# Patient Record
Sex: FEMALE | Race: WHITE | NOT HISPANIC OR LATINO | Employment: FULL TIME | URBAN - NONMETROPOLITAN AREA
[De-identification: names, ages, dates, MRNs, and addresses within clinical notes are randomized per-mention and may not be internally consistent; named-entity substitution may affect disease eponyms.]

---

## 2017-02-10 DIAGNOSIS — M25.512 CHRONIC PAIN OF BOTH SHOULDERS: ICD-10-CM

## 2017-02-10 DIAGNOSIS — M25.511 CHRONIC PAIN OF BOTH SHOULDERS: ICD-10-CM

## 2017-02-10 DIAGNOSIS — F41.9 ANXIETY: ICD-10-CM

## 2017-02-10 DIAGNOSIS — G89.29 CHRONIC PAIN OF BOTH SHOULDERS: ICD-10-CM

## 2017-02-10 RX ORDER — FLUOXETINE 10 MG/1
CAPSULE ORAL
Qty: 30 CAPSULE | Refills: 0 | Status: CANCELLED | OUTPATIENT
Start: 2017-02-10

## 2017-02-10 RX ORDER — TRAMADOL HYDROCHLORIDE 50 MG/1
TABLET ORAL
Qty: 60 TABLET | Refills: 0 | Status: CANCELLED | OUTPATIENT
Start: 2017-02-10

## 2017-02-10 RX ORDER — ALPRAZOLAM 0.5 MG/1
TABLET ORAL
Qty: 60 TABLET | Refills: 0 | OUTPATIENT
Start: 2017-02-10

## 2017-02-14 DIAGNOSIS — M25.512 CHRONIC PAIN OF BOTH SHOULDERS: ICD-10-CM

## 2017-02-14 DIAGNOSIS — F41.9 ANXIETY: ICD-10-CM

## 2017-02-14 DIAGNOSIS — M25.511 CHRONIC PAIN OF BOTH SHOULDERS: ICD-10-CM

## 2017-02-14 DIAGNOSIS — G89.29 CHRONIC PAIN OF BOTH SHOULDERS: ICD-10-CM

## 2017-02-14 RX ORDER — FLUOXETINE 10 MG/1
10 CAPSULE ORAL DAILY
Qty: 30 CAPSULE | Refills: 2 | Status: CANCELLED | OUTPATIENT
Start: 2017-02-14

## 2017-02-14 RX ORDER — TRAMADOL HYDROCHLORIDE 50 MG/1
50 TABLET ORAL EVERY 6 HOURS PRN
Qty: 60 TABLET | Refills: 2 | Status: CANCELLED | OUTPATIENT
Start: 2017-02-14

## 2017-02-14 NOTE — TELEPHONE ENCOUNTER
Control  If you are not covering for Ina please let me know and I will direct to the correct person. Thank you.

## 2017-02-27 DIAGNOSIS — G89.29 CHRONIC PAIN OF BOTH SHOULDERS: ICD-10-CM

## 2017-02-27 DIAGNOSIS — M25.511 CHRONIC PAIN OF BOTH SHOULDERS: ICD-10-CM

## 2017-02-27 DIAGNOSIS — F41.9 ANXIETY: ICD-10-CM

## 2017-02-27 DIAGNOSIS — M25.512 CHRONIC PAIN OF BOTH SHOULDERS: ICD-10-CM

## 2017-02-27 RX ORDER — FLUOXETINE 10 MG/1
CAPSULE ORAL
Qty: 30 CAPSULE | Refills: 0 | Status: SHIPPED | OUTPATIENT
Start: 2017-02-27 | End: 2017-03-14

## 2017-02-27 RX ORDER — TRAMADOL HYDROCHLORIDE 50 MG/1
TABLET ORAL
Qty: 60 TABLET | Refills: 0 | Status: SHIPPED | OUTPATIENT
Start: 2017-02-27 | End: 2017-03-14 | Stop reason: SDUPTHER

## 2017-02-27 RX ORDER — ALPRAZOLAM 0.5 MG/1
TABLET ORAL
Qty: 60 TABLET | Refills: 0 | Status: SHIPPED | OUTPATIENT
Start: 2017-02-27 | End: 2017-03-14 | Stop reason: SDUPTHER

## 2017-03-14 ENCOUNTER — OFFICE VISIT (OUTPATIENT)
Dept: FAMILY MEDICINE CLINIC | Facility: CLINIC | Age: 42
End: 2017-03-14

## 2017-03-14 VITALS
HEART RATE: 76 BPM | DIASTOLIC BLOOD PRESSURE: 82 MMHG | BODY MASS INDEX: 27.99 KG/M2 | WEIGHT: 168 LBS | HEIGHT: 65 IN | SYSTOLIC BLOOD PRESSURE: 118 MMHG

## 2017-03-14 DIAGNOSIS — E66.9 OBESITY (BMI 30.0-34.9): ICD-10-CM

## 2017-03-14 DIAGNOSIS — G89.29 CHRONIC RIGHT SHOULDER PAIN: Primary | ICD-10-CM

## 2017-03-14 DIAGNOSIS — G89.29 CHRONIC PAIN OF BOTH SHOULDERS: ICD-10-CM

## 2017-03-14 DIAGNOSIS — M25.511 CHRONIC PAIN OF BOTH SHOULDERS: ICD-10-CM

## 2017-03-14 DIAGNOSIS — M25.511 CHRONIC RIGHT SHOULDER PAIN: Primary | ICD-10-CM

## 2017-03-14 DIAGNOSIS — F41.9 ANXIETY: ICD-10-CM

## 2017-03-14 DIAGNOSIS — M25.512 CHRONIC PAIN OF BOTH SHOULDERS: ICD-10-CM

## 2017-03-14 DIAGNOSIS — J30.9 ALLERGIC RHINITIS, UNSPECIFIED ALLERGIC RHINITIS TRIGGER, UNSPECIFIED RHINITIS SEASONALITY: ICD-10-CM

## 2017-03-14 PROCEDURE — 99214 OFFICE O/P EST MOD 30 MIN: CPT | Performed by: NURSE PRACTITIONER

## 2017-03-14 RX ORDER — TRAMADOL HYDROCHLORIDE 50 MG/1
50 TABLET ORAL EVERY 6 HOURS PRN
Qty: 120 TABLET | Refills: 2 | Status: SHIPPED | OUTPATIENT
Start: 2017-03-14 | End: 2017-05-26 | Stop reason: SDUPTHER

## 2017-03-14 RX ORDER — ALPRAZOLAM 0.5 MG/1
0.5 TABLET ORAL 2 TIMES DAILY PRN
Qty: 60 TABLET | Refills: 0 | Status: SHIPPED | OUTPATIENT
Start: 2017-03-14 | End: 2017-04-11 | Stop reason: SDUPTHER

## 2017-03-14 RX ORDER — PHENTERMINE HYDROCHLORIDE 37.5 MG/1
37.5 TABLET ORAL EVERY MORNING
Qty: 30 TABLET | Refills: 0 | Status: SHIPPED | OUTPATIENT
Start: 2017-03-14 | End: 2017-05-10 | Stop reason: SDUPTHER

## 2017-03-14 RX ORDER — LORATADINE 10 MG/1
10 TABLET ORAL DAILY
Qty: 30 TABLET | Refills: 5 | Status: SHIPPED | OUTPATIENT
Start: 2017-03-14 | End: 2017-09-27 | Stop reason: SDUPTHER

## 2017-03-14 NOTE — PROGRESS NOTES
Subjective   Jaimie Ignacia Akers is a 42 y.o. female.     Allergies   Associated symptoms include arthralgias and congestion. Pertinent negatives include no abdominal pain, chest pain, chills, coughing, fatigue, fever, headaches, myalgias, nausea, rash, sore throat, vomiting or weakness.   Weight Loss   Associated symptoms include arthralgias and congestion. Pertinent negatives include no abdominal pain, chest pain, chills, coughing, fatigue, fever, headaches, myalgias, nausea, rash, sore throat, vomiting or weakness.      She has had head congestion and clear nasal drainage for a few weeks. She cannot tolerate Zyrtec well due to drowsiness. She is using Nasonex.    She has chronic right shoulder pain that is worsening and affecting her daily.  Tramadol and flexeril does not help very much.   She has not seen an orthopedist recently.     Her anxiety is not well-controlled with Prozac and prn Xanax. She stopped the Prozac due to feeling it worsened the symptoms. Xanax helped.    She wants to resume phentermine.     The following portions of the patient's history were reviewed and updated as appropriate: allergies, current medications, past medical history, past social history, past surgical history and problem list.    Review of Systems   Constitutional: Positive for weight loss. Negative for activity change, chills, fatigue and fever.        Difficulty losing weight.    HENT: Positive for congestion and sinus pressure. Negative for rhinorrhea and sore throat.    Eyes: Negative for pain, discharge, itching and visual disturbance.   Respiratory: Negative for cough, shortness of breath and wheezing.    Cardiovascular: Negative for chest pain, palpitations and leg swelling.   Gastrointestinal: Negative for abdominal pain, blood in stool, constipation, diarrhea, nausea and vomiting.   Endocrine: Negative for polydipsia and polyuria.   Genitourinary: Negative for dysuria, flank pain, frequency, hematuria, urgency and  vaginal bleeding.   Musculoskeletal: Positive for arthralgias. Negative for back pain, gait problem and myalgias.        Right shoulder pain.    Skin: Negative for rash.   Neurological: Negative for dizziness, tremors, seizures, syncope, weakness and headaches.   Hematological: Negative for adenopathy. Does not bruise/bleed easily.   Psychiatric/Behavioral: Negative for confusion, dysphoric mood, sleep disturbance and suicidal ideas. The patient is nervous/anxious.        Objective   Physical Exam   Constitutional: She is oriented to person, place, and time. She appears well-developed and well-nourished. No distress.   HENT:   Mouth/Throat: No oropharyngeal exudate.   Bilateral maxillary sinus tenderness   Eyes: Conjunctivae are normal. Pupils are equal, round, and reactive to light. Right eye exhibits no discharge. Left eye exhibits no discharge. No scleral icterus.   Neck: Neck supple. No thyromegaly present.   Cardiovascular: Normal rate, regular rhythm and normal heart sounds.    No murmur heard.  Pulmonary/Chest: Effort normal and breath sounds normal. No respiratory distress. She has no wheezes. She has no rales.   Musculoskeletal: Normal range of motion. She exhibits tenderness. She exhibits no edema or deformity.   Right anterior shoulder joint tenderness. Difficulty with abduction due to discomfort.     Lymphadenopathy:     She has no cervical adenopathy.   Neurological: She is alert and oriented to person, place, and time. No cranial nerve deficit. Coordination normal.   No balance disturbance observed.    Skin: Skin is warm and dry. No rash noted. No pallor.   Psychiatric: She has a normal mood and affect. Her behavior is normal.   Nursing note and vitals reviewed.      Assessment/Plan   Problems Addressed this Visit        Respiratory    Allergic rhinitis    Relevant Medications    loratadine (CLARITIN) 10 MG tablet       Other    Anxiety    Relevant Medications    ALPRAZolam (XANAX) 0.5 MG tablet       Other Visit Diagnoses     Chronic right shoulder pain    -  Primary    Relevant Medications    ALPRAZolam (XANAX) 0.5 MG tablet    diclofenac (VOLTAREN) 1 % gel gel    Other Relevant Orders    MRI Shoulder Right Without Contrast    Chronic pain of both shoulders        Relevant Medications    traMADol (ULTRAM) 50 MG tablet    Obesity (BMI 30.0-34.9)        Relevant Medications    phentermine (ADIPEX-P) 37.5 MG tablet        MRI right shoulder and refer to Dr. Gonzales if indicated after results.   Begin Voltaren gel.   Resume phentermine, diet and exercise.  Reviewed potential medication side effects and benefits. Instructed to report side effects. Report if symptoms worsen or persist. Understanding expressed.

## 2017-03-29 ENCOUNTER — OFFICE VISIT (OUTPATIENT)
Dept: OBSTETRICS AND GYNECOLOGY | Facility: CLINIC | Age: 42
End: 2017-03-29

## 2017-03-29 VITALS
SYSTOLIC BLOOD PRESSURE: 120 MMHG | HEIGHT: 65 IN | WEIGHT: 165.8 LBS | BODY MASS INDEX: 27.63 KG/M2 | HEART RATE: 80 BPM | DIASTOLIC BLOOD PRESSURE: 78 MMHG

## 2017-03-29 DIAGNOSIS — Z12.31 SCREENING MAMMOGRAM, ENCOUNTER FOR: ICD-10-CM

## 2017-03-29 DIAGNOSIS — Z01.419 ENCOUNTER FOR GYNECOLOGICAL EXAMINATION WITH PAPANICOLAOU SMEAR OF CERVIX: Primary | ICD-10-CM

## 2017-03-29 DIAGNOSIS — N92.1 EXCESSIVE AND FREQUENT MENSTRUATION WITH IRREGULAR CYCLE: ICD-10-CM

## 2017-03-29 PROCEDURE — 99396 PREV VISIT EST AGE 40-64: CPT | Performed by: NURSE PRACTITIONER

## 2017-03-29 PROCEDURE — 88141 CYTOPATH C/V INTERPRET: CPT | Performed by: PATHOLOGY

## 2017-03-29 PROCEDURE — 88142 CYTOPATH C/V THIN LAYER: CPT | Performed by: PATHOLOGY

## 2017-03-29 RX ORDER — MEDROXYPROGESTERONE ACETATE 5 MG/1
5 TABLET ORAL DAILY
Qty: 30 TABLET | Refills: 0 | Status: SHIPPED | OUTPATIENT
Start: 2017-03-29 | End: 2017-04-29 | Stop reason: SDUPTHER

## 2017-03-29 NOTE — PROGRESS NOTES
"Subjective   Chief Complaint   Patient presents with   • Gynecologic Exam     well woman annual visit     Jaimie Akers is a 42 y.o. year old  presenting to be seen for her annual exam.  Today she complains of irregular bleeding x 3 months. She started bleeding in Dec 2016 and has not stopped since. Bleeding varies in flow from spotting to heavy. She has had this happen several years ago and was put on OCPs that helped. She stopped taking them after approximately 1 yr. In the , PCP notes show she had same concerns. Labs were WNL. Provera 5mg x 10 days stopped pt's bleeding until intercourse which triggered a heavy flow.     Patient's last menstrual period was 2016 (approximate).    OB History      Para Term  AB TAB SAB Ectopic Multiple Living    1 1                  Current birth control method: none.     She has not been sexually active in \"several months.\"  In the past 12 months there has been new sexual partners.  Condoms are not typically used.  She would not like to be screened for STD's at today's exam.     Past 6 month menstrual history:    Cycle Frequency: unpredictable  irregular   Menstrual cycle character: light to heavy   Cycle Duration: Typically 5-7 days, until now   Number of heavy days of flows: Varies   Dysmenorrhea: none and is not affecting her activities of daily living   PMS: none and is not affecting her activities of daily living   Intermenstrual bleeding present: yes   Post-coital bleeding present: yes     She exercises regularly: yes.  She wears her seat belt:yes.  She has concerns about domestic violence: no.  Last colonoscopy: Never  Last DEXA: Never  Last MM  Last pap: , unsure of dates  History of abnormal PAP: No    The following portions of the patient's history were reviewed and updated as appropriate:problem list, current medications, allergies, past family history, past medical history, past social history and past surgical " "history.    Smoking status: Never Smoker                                                              Smokeless status: Never Used                        History   Alcohol Use No      Review of Systems   Constitutional: Negative.  Negative for chills and fever.   Respiratory: Negative.    Cardiovascular: Negative.    Gastrointestinal: Negative.  Negative for constipation and diarrhea.   Endocrine: Negative.  Negative for cold intolerance and heat intolerance.   Genitourinary: Positive for menstrual problem (see HPI). Negative for dyspareunia, pelvic pain, vaginal discharge and vaginal pain.   Skin: Negative.    Neurological: Negative for dizziness, syncope, light-headedness and headaches.   Psychiatric/Behavioral: Negative for suicidal ideas. The patient is not nervous/anxious.         Anxiety and depression, well managed         Objective   /78 (BP Location: Right arm, Patient Position: Sitting, Cuff Size: Adult)  Pulse 80  Ht 65\" (165.1 cm)  Wt 165 lb 12.8 oz (75.2 kg)  LMP 12/31/2016 (Approximate)  Breastfeeding? No  BMI 27.59 kg/m2    General:  well developed; well nourished  no acute distress   Skin:  No suspicious lesions seen   Thyroid: normal to inspection and palpation   Breasts:  Examined in supine position  Symmetric without masses or skin dimpling  Nipples normal without inversion, lesions or discharge  There are no palpable axillary nodes   Abdomen: soft, non-tender; no masses  no umbilical or inginual hernias are present  no hepato-splenomegaly  Normal findings: bowel sounds normal   Cardiac: Heart sounds are normal.  Regular rate and rhythm without murmur, gallop or rub.   Resp: Normal expansion.  Clear to auscultation.  No rales, rhonchi, or wheezing.   Psych: alert,oriented, in NAD with a full range of affect, normal behavior and no psychotic features   Pelvis: Uterus:  Clinical staff was present for exam  External genitalia:  normal appearance of the external genitalia including " Bartholin's and [place]ch[place]'s glands.  :  urethral meatus normal; urethral hypermobility is absent.  Vaginal:  normal pink mucosa without prolapse or lesions and blood present -  dark red, moderate amount and with clotting  Cervix:  normal appearance.  Uterus:  mildly enlarged and tender to palpation  Adnexa:  normal bimanual exam of the adnexa.     Lab Review   TSH and estradiol, progesterone, testosterone, FSH, LH    Imaging  No data reviewed       Assessment   1. Encounter for GYN exam with pap smear of cervix  2. Encounter for screening mammogram  3. Excessive and frequent menstruation with irregular cycle     Plan   1. Pap results: I will send card in mail or call if abnormal. RTC annually for well woman exams  2. Mammogram scheduled 4/18/17 at 3:00pm   3. Obtain TVUS tomorrow at 2:00pm. Follow up Friday at 10:00AM to review results and discuss next steps. Begin Provera 5mg to decrease bleeding. Consider STI testing prn.     New Medications Ordered This Visit   Medications   • medroxyPROGESTERone (PROVERA) 5 MG tablet     Sig: Take 1 tablet by mouth Daily.     Dispense:  30 tablet     Refill:  0          This note was electronically signed.    Iram Echols, APRN  March 29, 2017

## 2017-04-03 ENCOUNTER — APPOINTMENT (OUTPATIENT)
Dept: MRI IMAGING | Facility: HOSPITAL | Age: 42
End: 2017-04-03

## 2017-04-05 ENCOUNTER — OFFICE VISIT (OUTPATIENT)
Dept: OBSTETRICS AND GYNECOLOGY | Facility: CLINIC | Age: 42
End: 2017-04-05

## 2017-04-05 VITALS
HEART RATE: 64 BPM | HEIGHT: 65 IN | DIASTOLIC BLOOD PRESSURE: 76 MMHG | SYSTOLIC BLOOD PRESSURE: 120 MMHG | BODY MASS INDEX: 27.49 KG/M2 | RESPIRATION RATE: 18 BRPM | WEIGHT: 165 LBS

## 2017-04-05 DIAGNOSIS — N92.1 EXCESSIVE AND FREQUENT MENSTRUATION WITH IRREGULAR CYCLE: Primary | ICD-10-CM

## 2017-04-05 LAB
LAB AP CASE REPORT: NORMAL
LAB AP GYN ADDITIONAL INFORMATION: NORMAL
LAB AP GYN OTHER FINDINGS: NORMAL
Lab: NORMAL
PATH INTERP SPEC-IMP: NORMAL
STAT OF ADQ CVX/VAG CYTO-IMP: NORMAL

## 2017-04-05 PROCEDURE — 99212 OFFICE O/P EST SF 10 MIN: CPT | Performed by: NURSE PRACTITIONER

## 2017-04-05 NOTE — PROGRESS NOTES
Subjective   Jaimie Ignacia Akers is a 42 y.o. female.     History of Present Illness   Pt presents for follow up on ultrasound and plan of care for excessive and prolonged menstruation. US normal. Bleeding like hormonally related. Bleeding still occurring but lighter since starting Provera 5mg daily. Options presented to pt: Continue provera, depo provera, IUD, surgical consult for possible endometrial ablation.     The following portions of the patient's history were reviewed and updated as appropriate: allergies, current medications, past family history, past medical history, past social history, past surgical history and problem list.    Review of Systems   Constitutional: Negative.    Genitourinary: Positive for menstrual problem.       Objective   Physical Exam   Constitutional: She is oriented to person, place, and time. She appears well-developed and well-nourished.   Neurological: She is alert and oriented to person, place, and time.   Psychiatric: She has a normal mood and affect. Her behavior is normal.   Vitals reviewed.      Assessment/Plan   Jaimie was seen today for follow-up.    Diagnoses and all orders for this visit:    Excessive and frequent menstruation with irregular cycle     Pt is not currently sexually active but is not using hormonal contraceptives when she is. After discussion, pt believes she will best benefit from IUD placement for bleeding and contraceptive use. I discussed at length side effects and potential for continued irregular bleeding for some time. Discussed procedure also. Mirena brochure given. Pt wants to move forward with pre-cert and insertion at earliest convenience. We will contact pt to schedule.

## 2017-04-11 DIAGNOSIS — G89.29 CHRONIC RIGHT SHOULDER PAIN: ICD-10-CM

## 2017-04-11 DIAGNOSIS — M25.511 CHRONIC RIGHT SHOULDER PAIN: ICD-10-CM

## 2017-04-11 DIAGNOSIS — F41.9 ANXIETY: ICD-10-CM

## 2017-04-12 ENCOUNTER — APPOINTMENT (OUTPATIENT)
Dept: MRI IMAGING | Facility: HOSPITAL | Age: 42
End: 2017-04-12

## 2017-04-13 RX ORDER — BUPROPION HYDROCHLORIDE 150 MG/1
150 TABLET ORAL DAILY
Qty: 30 TABLET | Refills: 2 | Status: SHIPPED | OUTPATIENT
Start: 2017-04-13 | End: 2017-12-18

## 2017-04-13 RX ORDER — ALPRAZOLAM 0.5 MG/1
TABLET ORAL
Qty: 60 TABLET | Refills: 0 | Status: SHIPPED | OUTPATIENT
Start: 2017-04-13 | End: 2017-05-10 | Stop reason: SDUPTHER

## 2017-04-18 ENCOUNTER — APPOINTMENT (OUTPATIENT)
Dept: MRI IMAGING | Facility: HOSPITAL | Age: 42
End: 2017-04-18

## 2017-04-24 ENCOUNTER — APPOINTMENT (OUTPATIENT)
Dept: MRI IMAGING | Facility: HOSPITAL | Age: 42
End: 2017-04-24

## 2017-04-29 DIAGNOSIS — N92.1 EXCESSIVE AND FREQUENT MENSTRUATION WITH IRREGULAR CYCLE: ICD-10-CM

## 2017-05-02 RX ORDER — MEDROXYPROGESTERONE ACETATE 5 MG/1
TABLET ORAL
Qty: 30 TABLET | Refills: 0 | Status: SHIPPED | OUTPATIENT
Start: 2017-05-02 | End: 2017-05-03 | Stop reason: SDUPTHER

## 2017-05-03 RX ORDER — MEDROXYPROGESTERONE ACETATE 5 MG/1
5 TABLET ORAL DAILY
Qty: 30 TABLET | Refills: 5 | Status: SHIPPED | OUTPATIENT
Start: 2017-05-03 | End: 2017-12-06 | Stop reason: SDUPTHER

## 2017-05-10 DIAGNOSIS — G89.29 CHRONIC RIGHT SHOULDER PAIN: ICD-10-CM

## 2017-05-10 DIAGNOSIS — F41.9 ANXIETY: ICD-10-CM

## 2017-05-10 DIAGNOSIS — M25.511 CHRONIC RIGHT SHOULDER PAIN: ICD-10-CM

## 2017-05-10 DIAGNOSIS — E66.9 OBESITY (BMI 30.0-34.9): ICD-10-CM

## 2017-05-11 RX ORDER — PHENTERMINE HYDROCHLORIDE 37.5 MG/1
TABLET ORAL
Qty: 30 TABLET | Refills: 0 | Status: SHIPPED | OUTPATIENT
Start: 2017-05-11 | End: 2017-07-19 | Stop reason: SDUPTHER

## 2017-05-11 RX ORDER — ALPRAZOLAM 0.5 MG/1
TABLET ORAL
Qty: 60 TABLET | Refills: 0 | Status: SHIPPED | OUTPATIENT
Start: 2017-05-11 | End: 2017-05-26

## 2017-05-26 ENCOUNTER — OFFICE VISIT (OUTPATIENT)
Dept: FAMILY MEDICINE CLINIC | Facility: CLINIC | Age: 42
End: 2017-05-26

## 2017-05-26 VITALS
HEART RATE: 94 BPM | DIASTOLIC BLOOD PRESSURE: 74 MMHG | TEMPERATURE: 98.2 F | OXYGEN SATURATION: 99 % | RESPIRATION RATE: 17 BRPM | SYSTOLIC BLOOD PRESSURE: 116 MMHG | WEIGHT: 169.2 LBS | BODY MASS INDEX: 28.19 KG/M2 | HEIGHT: 65 IN

## 2017-05-26 DIAGNOSIS — F41.9 ANXIETY: ICD-10-CM

## 2017-05-26 DIAGNOSIS — G89.29 CHRONIC PAIN OF BOTH SHOULDERS: Primary | ICD-10-CM

## 2017-05-26 DIAGNOSIS — F32.A DEPRESSIVE DISORDER: ICD-10-CM

## 2017-05-26 DIAGNOSIS — M25.512 CHRONIC PAIN OF BOTH SHOULDERS: Primary | ICD-10-CM

## 2017-05-26 DIAGNOSIS — M25.511 CHRONIC PAIN OF BOTH SHOULDERS: Primary | ICD-10-CM

## 2017-05-26 PROCEDURE — 99213 OFFICE O/P EST LOW 20 MIN: CPT | Performed by: NURSE PRACTITIONER

## 2017-05-26 RX ORDER — PAROXETINE 10 MG/1
10 TABLET, FILM COATED ORAL DAILY
Qty: 30 TABLET | Refills: 2 | Status: SHIPPED | OUTPATIENT
Start: 2017-05-26 | End: 2017-12-18

## 2017-05-26 RX ORDER — TRAMADOL HYDROCHLORIDE 50 MG/1
50 TABLET ORAL EVERY 6 HOURS PRN
Qty: 120 TABLET | Refills: 2 | Status: SHIPPED | OUTPATIENT
Start: 2017-05-26 | End: 2017-12-18

## 2017-05-26 RX ORDER — ALPRAZOLAM 0.5 MG/1
0.5 TABLET ORAL 2 TIMES DAILY PRN
Qty: 60 TABLET | Refills: 0 | Status: CANCELLED | OUTPATIENT
Start: 2017-05-26

## 2017-05-26 RX ORDER — CLONAZEPAM 0.5 MG/1
0.5 TABLET ORAL 2 TIMES DAILY PRN
Qty: 30 TABLET | Refills: 0 | Status: SHIPPED | OUTPATIENT
Start: 2017-05-26 | End: 2017-07-19 | Stop reason: SDUPTHER

## 2017-05-28 PROBLEM — G89.29 CHRONIC PAIN OF BOTH SHOULDERS: Status: ACTIVE | Noted: 2017-05-28

## 2017-05-28 PROBLEM — M25.512 CHRONIC PAIN OF BOTH SHOULDERS: Status: ACTIVE | Noted: 2017-05-28

## 2017-05-28 PROBLEM — M25.511 CHRONIC PAIN OF BOTH SHOULDERS: Status: ACTIVE | Noted: 2017-05-28

## 2017-05-30 DIAGNOSIS — F32.A ANXIETY AND DEPRESSION: Primary | ICD-10-CM

## 2017-05-30 DIAGNOSIS — F41.9 ANXIETY AND DEPRESSION: Primary | ICD-10-CM

## 2017-06-09 RX ORDER — ESCITALOPRAM OXALATE 10 MG/1
10 TABLET ORAL DAILY
Qty: 30 TABLET | Refills: 2 | Status: SHIPPED | OUTPATIENT
Start: 2017-06-09 | End: 2017-09-11 | Stop reason: SDUPTHER

## 2017-06-13 ENCOUNTER — TELEPHONE (OUTPATIENT)
Dept: FAMILY MEDICINE CLINIC | Facility: CLINIC | Age: 42
End: 2017-06-13

## 2017-06-13 RX ORDER — ALPRAZOLAM 0.5 MG/1
TABLET ORAL
Qty: 60 TABLET | Refills: 0 | Status: SHIPPED | OUTPATIENT
Start: 2017-06-13 | End: 2017-06-13

## 2017-07-19 DIAGNOSIS — F41.9 ANXIETY: ICD-10-CM

## 2017-07-19 DIAGNOSIS — E66.9 OBESITY (BMI 30.0-34.9): ICD-10-CM

## 2017-07-20 RX ORDER — PHENTERMINE HYDROCHLORIDE 37.5 MG/1
TABLET ORAL
Qty: 30 TABLET | Refills: 0 | Status: SHIPPED | OUTPATIENT
Start: 2017-07-20 | End: 2017-12-18

## 2017-07-20 RX ORDER — CLONAZEPAM 0.5 MG/1
TABLET ORAL
Qty: 30 TABLET | Refills: 0 | Status: SHIPPED | OUTPATIENT
Start: 2017-07-20 | End: 2017-12-18

## 2017-08-11 DIAGNOSIS — F41.9 ANXIETY: ICD-10-CM

## 2017-08-14 RX ORDER — CLONAZEPAM 0.5 MG/1
TABLET ORAL
Qty: 30 TABLET | Refills: 0 | OUTPATIENT
Start: 2017-08-14

## 2017-08-22 RX ORDER — PHENTERMINE HYDROCHLORIDE 37.5 MG/1
TABLET ORAL
Qty: 30 TABLET | Refills: 0 | OUTPATIENT
Start: 2017-08-22

## 2017-09-11 RX ORDER — ESCITALOPRAM OXALATE 10 MG/1
TABLET ORAL
Qty: 30 TABLET | Refills: 2 | Status: SHIPPED | OUTPATIENT
Start: 2017-09-11 | End: 2017-12-10 | Stop reason: SDUPTHER

## 2017-09-27 DIAGNOSIS — J30.9 ALLERGIC RHINITIS, UNSPECIFIED ALLERGIC RHINITIS TRIGGER, UNSPECIFIED RHINITIS SEASONALITY: ICD-10-CM

## 2017-09-27 RX ORDER — LORATADINE 10 MG/1
TABLET ORAL
Qty: 30 TABLET | Refills: 5 | Status: SHIPPED | OUTPATIENT
Start: 2017-09-27 | End: 2018-03-16 | Stop reason: ALTCHOICE

## 2017-10-02 RX ORDER — PHENTERMINE HYDROCHLORIDE 37.5 MG/1
TABLET ORAL
Qty: 30 TABLET | Refills: 0 | OUTPATIENT
Start: 2017-10-02

## 2017-11-26 DIAGNOSIS — N92.1 EXCESSIVE AND FREQUENT MENSTRUATION WITH IRREGULAR CYCLE: ICD-10-CM

## 2017-11-27 RX ORDER — MEDROXYPROGESTERONE ACETATE 5 MG/1
TABLET ORAL
Qty: 30 TABLET | Refills: 5 | OUTPATIENT
Start: 2017-11-27

## 2017-12-06 ENCOUNTER — OFFICE VISIT (OUTPATIENT)
Dept: OBSTETRICS AND GYNECOLOGY | Facility: CLINIC | Age: 42
End: 2017-12-06

## 2017-12-06 VITALS
RESPIRATION RATE: 16 BRPM | SYSTOLIC BLOOD PRESSURE: 122 MMHG | BODY MASS INDEX: 28.56 KG/M2 | HEIGHT: 65 IN | WEIGHT: 171.4 LBS | HEART RATE: 71 BPM | DIASTOLIC BLOOD PRESSURE: 80 MMHG

## 2017-12-06 DIAGNOSIS — N92.1 EXCESSIVE AND FREQUENT MENSTRUATION WITH IRREGULAR CYCLE: ICD-10-CM

## 2017-12-06 PROCEDURE — 99213 OFFICE O/P EST LOW 20 MIN: CPT | Performed by: NURSE PRACTITIONER

## 2017-12-06 RX ORDER — MEDROXYPROGESTERONE ACETATE 5 MG/1
5 TABLET ORAL DAILY
Qty: 30 TABLET | Refills: 11 | Status: SHIPPED | OUTPATIENT
Start: 2017-12-06 | End: 2018-12-03 | Stop reason: SDUPTHER

## 2017-12-06 NOTE — PROGRESS NOTES
Subjective   Jaimie Akers is a 42 y.o. female.     History of Present Illness   Pt presents for refills on Provera 5mg. She states she is doing well on them, periods regular, lasting 5 days, medium flow. Would like to continue use. She has not had MMG. She is due for well woman exam 3/29/18.    The following portions of the patient's history were reviewed and updated as appropriate: allergies, current medications, past family history, past medical history, past social history, past surgical history and problem list.    Review of Systems   Constitutional: Negative.    Respiratory: Negative.    Cardiovascular: Negative.    Genitourinary: Negative.  Negative for menstrual problem.   Neurological: Negative for dizziness, syncope and headaches.   Psychiatric/Behavioral:        Depression, has appointment for medication adjustment with PCP JOSEPH Amato on Monday       Objective   Physical Exam   Constitutional: She is oriented to person, place, and time. She appears well-developed and well-nourished.   Cardiovascular: Normal rate and regular rhythm.    Pulmonary/Chest: Effort normal.   Genitourinary:   Genitourinary Comments: Exam deferred   Neurological: She is alert and oriented to person, place, and time.   Psychiatric: She has a normal mood and affect. Her behavior is normal.   Vitals reviewed.      Assessment/Plan   Jaimie was seen today for med refill.    Diagnoses and all orders for this visit:    Excessive and frequent menstruation with irregular cycle  -     medroxyPROGESTERone (PROVERA) 5 MG tablet; Take 1 tablet by mouth Daily.     Continue Provera 5mg one tab let PO daily. RTC in 4 months for well woman exam. Discussed importance and need for MMG. Pt agrees to schedule well woman and MMG upon leaving today's appointment. All questions answered.

## 2017-12-11 RX ORDER — ESCITALOPRAM OXALATE 10 MG/1
TABLET ORAL
Qty: 30 TABLET | Refills: 0 | Status: SHIPPED | OUTPATIENT
Start: 2017-12-11 | End: 2017-12-18

## 2017-12-18 ENCOUNTER — OFFICE VISIT (OUTPATIENT)
Dept: FAMILY MEDICINE CLINIC | Facility: CLINIC | Age: 42
End: 2017-12-18

## 2017-12-18 VITALS
BODY MASS INDEX: 28.82 KG/M2 | HEIGHT: 65 IN | WEIGHT: 173 LBS | TEMPERATURE: 98.4 F | HEART RATE: 78 BPM | SYSTOLIC BLOOD PRESSURE: 140 MMHG | DIASTOLIC BLOOD PRESSURE: 80 MMHG

## 2017-12-18 DIAGNOSIS — F41.9 ANXIETY: ICD-10-CM

## 2017-12-18 DIAGNOSIS — M54.12 BRACHIAL NEURITIS: Primary | ICD-10-CM

## 2017-12-18 DIAGNOSIS — F32.A DEPRESSION, UNSPECIFIED DEPRESSION TYPE: ICD-10-CM

## 2017-12-18 PROCEDURE — 99213 OFFICE O/P EST LOW 20 MIN: CPT | Performed by: NURSE PRACTITIONER

## 2017-12-18 RX ORDER — GABAPENTIN 300 MG/1
300 CAPSULE ORAL 3 TIMES DAILY
Qty: 90 CAPSULE | Refills: 2 | Status: SHIPPED | OUTPATIENT
Start: 2017-12-18 | End: 2018-02-16 | Stop reason: SDUPTHER

## 2017-12-18 RX ORDER — HYDROCODONE BITARTRATE AND ACETAMINOPHEN 7.5; 325 MG/1; MG/1
TABLET ORAL
Qty: 60 TABLET | Refills: 0 | Status: SHIPPED | OUTPATIENT
Start: 2017-12-18 | End: 2018-01-18 | Stop reason: SDUPTHER

## 2017-12-18 RX ORDER — ESCITALOPRAM OXALATE 20 MG/1
20 TABLET ORAL DAILY
Qty: 30 TABLET | Refills: 5 | Status: SHIPPED | OUTPATIENT
Start: 2017-12-18 | End: 2018-08-16

## 2017-12-18 RX ORDER — HYDROCODONE BITARTRATE AND ACETAMINOPHEN 7.5; 325 MG/1; MG/1
TABLET ORAL
Qty: 90 TABLET | Refills: 0 | Status: SHIPPED | OUTPATIENT
Start: 2017-12-18 | End: 2017-12-18 | Stop reason: SDUPTHER

## 2017-12-18 NOTE — PROGRESS NOTES
Subjective   Jaimie Ignacia Akers is a 42 y.o. female.   Chief Complaint   Patient presents with   • Follow-up   • Med Refill       Anxiety   Symptoms include nervous/anxious behavior. Patient reports no confusion, dizziness, palpitations, shortness of breath or suicidal ideas.       Neck Pain      Allergies   Associated symptoms include arthralgias, myalgias and neck pain.      She has chronic right sided pain neck and right shoulder pain present and worsening since 2012. This has worsened the past few months and is not well-controlled with OTC NSAIDs and occasional tramadol. An MRI of the C spine in 2015 revealed disk bulges and osteophytes. PT twice in the past did not help much. She does continues the exercises daily. She is alternating ice and heat. She does not do well with several muscle relaxers tried in the past.     She is having depression related to the pain. She is not able to work due to pain, depression and anxiety.   She is gaining weight due to reduced activity. She is not sleeping well and cant focus well.     Her anxiety and depression is not well-controlled with Prozac, Wellbutrin, Lexapro or prn Xanax.  This is been a problem for years and she stopped the Prozac due to feeling it worsened the symptoms. Wellbutrin doesn't help the depression or anxiety. Xanax makes her feel irritable although it helps anxiety. She states if it wasn't for her family she wouldn't want to live.     She is considering filing for social security disability.        The following portions of the patient's history were reviewed and updated as appropriate: allergies, current medications, past medical history, past social history, past surgical history and problem list.    Review of Systems   Constitutional: Negative for activity change.        Difficulty losing weight.    HENT: Negative for rhinorrhea and sinus pressure.    Eyes: Negative for pain, discharge, itching and visual disturbance.   Respiratory: Negative for  shortness of breath and wheezing.    Cardiovascular: Negative for palpitations and leg swelling.   Gastrointestinal: Negative for blood in stool, constipation and diarrhea.   Endocrine: Negative for polydipsia and polyuria.   Genitourinary: Negative for dysuria, flank pain, frequency, hematuria, urgency and vaginal bleeding.   Musculoskeletal: Positive for arthralgias, myalgias and neck pain. Negative for back pain and gait problem.        Right shoulder pain.    Neurological: Negative for dizziness, tremors, seizures and syncope.   Hematological: Negative for adenopathy. Does not bruise/bleed easily.   Psychiatric/Behavioral: Positive for dysphoric mood. Negative for confusion, sleep disturbance and suicidal ideas. The patient is nervous/anxious.        Objective   Physical Exam   Constitutional: She is oriented to person, place, and time. She appears well-developed and well-nourished. No distress.   HENT:   Mouth/Throat: No oropharyngeal exudate.   Eyes: Conjunctivae are normal. Pupils are equal, round, and reactive to light. Right eye exhibits no discharge. Left eye exhibits no discharge. No scleral icterus.   Neck: Neck supple. No thyromegaly present.   Cardiovascular: Normal rate, regular rhythm and normal heart sounds.    No murmur heard.  Pulmonary/Chest: Effort normal and breath sounds normal. No respiratory distress. She has no wheezes. She has no rales.   Musculoskeletal: Normal range of motion. She exhibits tenderness. She exhibits no edema or deformity.   Cervical tenderness. Decreased right arm proximal muscle and hand  strength. Neck is stiff with decreased lateral ROM and hyperextension.    Lymphadenopathy:     She has no cervical adenopathy.   Neurological: She is alert and oriented to person, place, and time. No cranial nerve deficit. Coordination normal.   No balance disturbance observed.    Skin: Skin is warm and dry. No rash noted. No pallor.   Psychiatric: She has a normal mood and affect.  Her behavior is normal.   Nursing note and vitals reviewed.      Assessment/Plan   Problems Addressed this Visit        Other    Anxiety    Relevant Medications    escitalopram (LEXAPRO) 20 MG tablet      Other Visit Diagnoses     Brachial neuritis    -  Primary    Relevant Medications    gabapentin (NEURONTIN) 300 MG capsule    HYDROcodone-acetaminophen (LORTAB) 7.5-325 MG per tablet    Other Relevant Orders    MRI Cervical Spine Without Contrast    Depression, unspecified depression type        Relevant Medications    escitalopram (LEXAPRO) 20 MG tablet        MRI spine. Pain management referral after MRI results. Surgical referral afterwards also.   Change Tramadol to Lortab if needed. Reviewed potential side effects including dependency.  Increase Lexapro. Agrees to consider counseling for depression/anxiety.   Instructed to report side effects. Report if symptoms worsen or persist. Understanding expressed.  F/U one month and prn.

## 2017-12-29 DIAGNOSIS — Z12.31 SCREENING MAMMOGRAM, ENCOUNTER FOR: Primary | ICD-10-CM

## 2018-01-03 ENCOUNTER — APPOINTMENT (OUTPATIENT)
Dept: MRI IMAGING | Facility: HOSPITAL | Age: 43
End: 2018-01-03

## 2018-01-08 ENCOUNTER — HOSPITAL ENCOUNTER (OUTPATIENT)
Dept: MRI IMAGING | Facility: HOSPITAL | Age: 43
Discharge: HOME | End: 2018-01-08
Admitting: NURSE PRACTITIONER

## 2018-01-08 DIAGNOSIS — M54.12 BRACHIAL NEURITIS: ICD-10-CM

## 2018-01-08 PROCEDURE — 72141 MRI NECK SPINE W/O DYE: CPT

## 2018-01-18 ENCOUNTER — OFFICE VISIT (OUTPATIENT)
Dept: FAMILY MEDICINE CLINIC | Facility: CLINIC | Age: 43
End: 2018-01-18

## 2018-01-18 VITALS
HEART RATE: 76 BPM | DIASTOLIC BLOOD PRESSURE: 72 MMHG | TEMPERATURE: 98 F | WEIGHT: 177 LBS | HEIGHT: 65 IN | BODY MASS INDEX: 29.49 KG/M2 | SYSTOLIC BLOOD PRESSURE: 112 MMHG

## 2018-01-18 DIAGNOSIS — J01.90 ACUTE SINUSITIS, RECURRENCE NOT SPECIFIED, UNSPECIFIED LOCATION: ICD-10-CM

## 2018-01-18 DIAGNOSIS — M50.30 DDD (DEGENERATIVE DISC DISEASE), CERVICAL: Primary | ICD-10-CM

## 2018-01-18 DIAGNOSIS — E66.9 OBESITY WITHOUT SERIOUS COMORBIDITY, UNSPECIFIED CLASSIFICATION, UNSPECIFIED OBESITY TYPE: ICD-10-CM

## 2018-01-18 DIAGNOSIS — M54.12 BRACHIAL NEURITIS: ICD-10-CM

## 2018-01-18 PROCEDURE — 99214 OFFICE O/P EST MOD 30 MIN: CPT | Performed by: NURSE PRACTITIONER

## 2018-01-18 RX ORDER — HYDROCODONE BITARTRATE AND ACETAMINOPHEN 7.5; 325 MG/1; MG/1
TABLET ORAL
Qty: 60 TABLET | Refills: 0 | Status: SHIPPED | OUTPATIENT
Start: 2018-01-18 | End: 2018-02-16 | Stop reason: SDUPTHER

## 2018-01-18 RX ORDER — PHENTERMINE HYDROCHLORIDE 37.5 MG/1
37.5 CAPSULE ORAL EVERY MORNING
Qty: 30 CAPSULE | Refills: 0 | Status: SHIPPED | OUTPATIENT
Start: 2018-01-18 | End: 2018-02-16 | Stop reason: SDUPTHER

## 2018-01-18 RX ORDER — AMOXICILLIN AND CLAVULANATE POTASSIUM 875; 125 MG/1; MG/1
1 TABLET, FILM COATED ORAL EVERY 12 HOURS SCHEDULED
Qty: 14 TABLET | Refills: 0 | Status: SHIPPED | OUTPATIENT
Start: 2018-01-18 | End: 2018-02-16

## 2018-01-18 NOTE — PROGRESS NOTES
Subjective   Jaimie Akers is a 42 y.o. female.   Chief Complaint   Patient presents with   • Results     MRI   • URI       URI    Associated symptoms include congestion, neck pain and sinus pain. Pertinent negatives include no diarrhea, dysuria, rhinorrhea or wheezing.   Anxiety   Symptoms include nervous/anxious behavior. Patient reports no confusion, dizziness, palpitations, shortness of breath or suicidal ideas.       Neck Pain      Allergies   Associated symptoms include arthralgias, congestion, myalgias and neck pain.      Here to f/u on chronic pain. She has chronic right sided pain neck and right shoulder pain present and worsening since 2012. This has worsened the past few months and is not well-controlled with OTC NSAIDs and occasional tramadol. An MRI of the C spine in 2015 revealed disk bulges and osteophytes. PT twice in the past did not help much. She does continues the exercises daily. She is alternating ice and heat. She does not do well with several muscle relaxers tried in the past. Lortab has relieved the pain. She did have constipation relieved by increased water and fiber.     Her depression has improved since her pain improved.     She is having left maxillary sinus pain for two weeks. This has not improved with otc medications.     She is interested in resuming phentermine to help with weight loss.       The following portions of the patient's history were reviewed and updated as appropriate: allergies, current medications, past medical history, past social history, past surgical history and problem list.    Review of Systems   Constitutional: Negative for activity change.        Difficulty losing weight.    HENT: Positive for congestion, sinus pain and sinus pressure. Negative for rhinorrhea.    Eyes: Negative for pain, discharge, itching and visual disturbance.   Respiratory: Negative for shortness of breath and wheezing.    Cardiovascular: Negative for palpitations and leg  swelling.   Gastrointestinal: Negative for blood in stool, constipation and diarrhea.   Endocrine: Negative for polydipsia and polyuria.   Genitourinary: Negative for dysuria, flank pain, frequency, hematuria, urgency and vaginal bleeding.   Musculoskeletal: Positive for arthralgias, myalgias, neck pain and neck stiffness. Negative for back pain and gait problem.        Right shoulder pain.    Neurological: Negative for dizziness, tremors, seizures and syncope.   Hematological: Negative for adenopathy. Does not bruise/bleed easily.   Psychiatric/Behavioral: Positive for dysphoric mood. Negative for confusion, sleep disturbance and suicidal ideas. The patient is nervous/anxious.        Objective   Physical Exam   Constitutional: She is oriented to person, place, and time. She appears well-developed and well-nourished. No distress.   HENT:   Mouth/Throat: No oropharyngeal exudate.   Eyes: Conjunctivae are normal. Pupils are equal, round, and reactive to light. Right eye exhibits no discharge. Left eye exhibits no discharge. No scleral icterus.   Neck: Neck supple. No thyromegaly present.   Cardiovascular: Normal rate, regular rhythm and normal heart sounds.    No murmur heard.  Pulmonary/Chest: Effort normal and breath sounds normal. No respiratory distress. She has no wheezes. She has no rales.   Musculoskeletal: Normal range of motion. She exhibits tenderness. She exhibits no edema or deformity.   Cervical tenderness.Neck is stiff with decreased lateral ROM and hyperextension.    Lymphadenopathy:     She has no cervical adenopathy.   Neurological: She is alert and oriented to person, place, and time. No cranial nerve deficit. Coordination normal.   No balance disturbance observed.    Skin: Skin is warm and dry. No rash noted. No pallor.   Psychiatric: She has a normal mood and affect. Her behavior is normal.   Nursing note and vitals reviewed.      Assessment/Plan   Problems Addressed this Visit        Digestive     Obesity    Relevant Medications    phentermine 37.5 MG capsule      Other Visit Diagnoses     DDD (degenerative disc disease), cervical    -  Primary    Brachial neuritis        Relevant Medications    HYDROcodone-acetaminophen (LORTAB) 7.5-325 MG per tablet    Acute sinusitis, recurrence not specified, unspecified location        Relevant Medications    amoxicillin-clavulanate (AUGMENTIN) 875-125 MG per tablet          PT referral for neck pain and stiffness.   Pain management referral if requires more than two Lortab per day.   Continue Lortab if needed. Reviewed potential side effects including dependency.  Instructed to report side effects. Report if symptoms worsen or persist. Understanding expressed.  F/U one month and prn.

## 2018-01-19 DIAGNOSIS — M54.2 NECK PAIN: Primary | ICD-10-CM

## 2018-02-16 ENCOUNTER — OFFICE VISIT (OUTPATIENT)
Dept: FAMILY MEDICINE CLINIC | Facility: CLINIC | Age: 43
End: 2018-02-16

## 2018-02-16 VITALS
TEMPERATURE: 97.7 F | WEIGHT: 172 LBS | SYSTOLIC BLOOD PRESSURE: 110 MMHG | BODY MASS INDEX: 28.66 KG/M2 | HEIGHT: 65 IN | DIASTOLIC BLOOD PRESSURE: 60 MMHG | HEART RATE: 90 BPM

## 2018-02-16 DIAGNOSIS — J01.90 ACUTE SINUSITIS, RECURRENCE NOT SPECIFIED, UNSPECIFIED LOCATION: ICD-10-CM

## 2018-02-16 DIAGNOSIS — F41.1 ANXIETY STATE: ICD-10-CM

## 2018-02-16 DIAGNOSIS — E66.9 OBESITY WITHOUT SERIOUS COMORBIDITY, UNSPECIFIED CLASSIFICATION, UNSPECIFIED OBESITY TYPE: ICD-10-CM

## 2018-02-16 DIAGNOSIS — M54.12 BRACHIAL NEURITIS: Primary | ICD-10-CM

## 2018-02-16 PROCEDURE — 99214 OFFICE O/P EST MOD 30 MIN: CPT | Performed by: NURSE PRACTITIONER

## 2018-02-16 RX ORDER — CEFDINIR 300 MG/1
300 CAPSULE ORAL 2 TIMES DAILY
Qty: 14 CAPSULE | Refills: 0 | Status: SHIPPED | OUTPATIENT
Start: 2018-02-16 | End: 2018-03-16

## 2018-02-16 RX ORDER — PHENTERMINE HYDROCHLORIDE 37.5 MG/1
37.5 CAPSULE ORAL EVERY MORNING
Qty: 30 CAPSULE | Refills: 0 | Status: SHIPPED | OUTPATIENT
Start: 2018-02-16 | End: 2018-03-16 | Stop reason: SDUPTHER

## 2018-02-16 RX ORDER — CEFDINIR 300 MG/1
300 CAPSULE ORAL 2 TIMES DAILY
Qty: 14 CAPSULE | Refills: 0 | Status: SHIPPED | OUTPATIENT
Start: 2018-02-16 | End: 2018-02-16 | Stop reason: SDUPTHER

## 2018-02-16 RX ORDER — HYDROCODONE BITARTRATE AND ACETAMINOPHEN 7.5; 325 MG/1; MG/1
TABLET ORAL
Qty: 60 TABLET | Refills: 0 | Status: SHIPPED | OUTPATIENT
Start: 2018-02-16 | End: 2018-03-06 | Stop reason: SDUPTHER

## 2018-02-16 RX ORDER — GABAPENTIN 300 MG/1
300 CAPSULE ORAL 3 TIMES DAILY
Qty: 90 CAPSULE | Refills: 2 | Status: SHIPPED | OUTPATIENT
Start: 2018-02-16 | End: 2018-09-27

## 2018-02-16 NOTE — PROGRESS NOTES
Subjective   Jaimie Akers is a 43 y.o. female.   Chief Complaint   Patient presents with   • Follow-up       URI    Associated symptoms include congestion, neck pain and sinus pain. Pertinent negatives include no diarrhea, dysuria, rhinorrhea or wheezing.   Anxiety   Symptoms include nervous/anxious behavior. Patient reports no confusion, dizziness, palpitations, shortness of breath or suicidal ideas.       Neck Pain      Allergies   Associated symptoms include arthralgias, congestion, myalgias and neck pain.      Here to f/u on chronic pain. She has chronic right sided pain neck and right shoulder pain present and worsening since 2012. This has worsened the past few months and is not well-controlled with OTC NSAIDs and occasional tramadol. An MRI of the C spine in 2015 revealed disk bulges and osteophytes. PT twice in the past did not help much. She does continues the exercises daily. She is alternating ice and heat. She does not do well with several muscle relaxers tried in the past. Lortab has relieved the pain. She did have constipation relieved by increased water and fiber.     Her depression has improved since her pain improved.     She is having left maxillary sinus pain for a few weeks. This has not improved with otc medications and sinus rinses.     She is taking phentermine to help with weight loss. She lost 5 pounds in 3 weeks. She admits to feeling more anxious recently. She denies other potential side effects.       The following portions of the patient's history were reviewed and updated as appropriate: allergies, current medications, past medical history, past social history, past surgical history and problem list.    Review of Systems   Constitutional: Negative for activity change.        Difficulty losing weight.    HENT: Positive for congestion, sinus pain and sinus pressure. Negative for rhinorrhea.    Eyes: Negative for pain, discharge, itching and visual disturbance.   Respiratory:  Negative for shortness of breath and wheezing.    Cardiovascular: Negative for palpitations and leg swelling.   Gastrointestinal: Negative for blood in stool, constipation and diarrhea.   Endocrine: Negative for polydipsia and polyuria.   Genitourinary: Negative for dysuria, flank pain, frequency, hematuria, urgency and vaginal bleeding.   Musculoskeletal: Positive for arthralgias, myalgias, neck pain and neck stiffness. Negative for back pain and gait problem.        Right shoulder pain.    Neurological: Negative for dizziness, tremors, seizures and syncope.   Hematological: Negative for adenopathy. Does not bruise/bleed easily.   Psychiatric/Behavioral: Positive for dysphoric mood. Negative for confusion, sleep disturbance and suicidal ideas. The patient is nervous/anxious.        Objective   Physical Exam   Constitutional: She is oriented to person, place, and time. She appears well-developed and well-nourished. No distress.   HENT:   Mouth/Throat: No oropharyngeal exudate.   Eyes: Conjunctivae are normal. Pupils are equal, round, and reactive to light. Right eye exhibits no discharge. Left eye exhibits no discharge. No scleral icterus.   Neck: Neck supple. No thyromegaly present.   Cardiovascular: Normal rate, regular rhythm and normal heart sounds.    No murmur heard.  Pulmonary/Chest: Effort normal and breath sounds normal. No respiratory distress. She has no wheezes. She has no rales.   Musculoskeletal: Normal range of motion. She exhibits tenderness. She exhibits no edema or deformity.   Cervical tenderness.Neck is stiff with decreased lateral ROM and hyperextension.    Lymphadenopathy:     She has no cervical adenopathy.   Neurological: She is alert and oriented to person, place, and time. No cranial nerve deficit. Coordination normal.   No balance disturbance observed.    Skin: Skin is warm and dry. No rash noted. No pallor.   Psychiatric: She has a normal mood and affect. Her behavior is normal.   Nursing  note and vitals reviewed.      Assessment/Plan   Problems Addressed this Visit        Digestive    Obesity    Relevant Medications    phentermine 37.5 MG capsule       Other    Anxiety state      Other Visit Diagnoses     Brachial neuritis    -  Primary    Relevant Medications    HYDROcodone-acetaminophen (LORTAB) 7.5-325 MG per tablet    gabapentin (NEURONTIN) 300 MG capsule    Acute sinusitis, recurrence not specified, unspecified location        Relevant Medications    cefdinir (OMNICEF) 300 MG capsule          Hold phentermine for 3 days to see if anxiety improves. If not , will order genesight testing and adjust   Lexapro.  Pain management referral if requires more than two Lortab per day.   Continue Lortab if needed. Reviewed potential side effects including dependency.  Instructed to report side effects. Report if symptoms worsen or persist. Understanding expressed.  F/U one month and prn.

## 2018-03-06 DIAGNOSIS — M54.12 BRACHIAL NEURITIS: Primary | ICD-10-CM

## 2018-03-06 DIAGNOSIS — M54.12 BRACHIAL NEURITIS: ICD-10-CM

## 2018-03-06 RX ORDER — HYDROCODONE BITARTRATE AND ACETAMINOPHEN 7.5; 325 MG/1; MG/1
TABLET ORAL
Qty: 60 TABLET | Refills: 0 | Status: SHIPPED | OUTPATIENT
Start: 2018-03-06 | End: 2018-09-27

## 2018-03-16 ENCOUNTER — OFFICE VISIT (OUTPATIENT)
Dept: FAMILY MEDICINE CLINIC | Facility: CLINIC | Age: 43
End: 2018-03-16

## 2018-03-16 VITALS
RESPIRATION RATE: 16 BRPM | BODY MASS INDEX: 27.99 KG/M2 | HEART RATE: 81 BPM | TEMPERATURE: 98.8 F | WEIGHT: 168 LBS | HEIGHT: 65 IN | DIASTOLIC BLOOD PRESSURE: 76 MMHG | OXYGEN SATURATION: 99 % | SYSTOLIC BLOOD PRESSURE: 116 MMHG

## 2018-03-16 DIAGNOSIS — Z86.2 HISTORY OF IRON DEFICIENCY ANEMIA: ICD-10-CM

## 2018-03-16 DIAGNOSIS — M54.2 CHRONIC NECK PAIN: ICD-10-CM

## 2018-03-16 DIAGNOSIS — E66.3 OVERWEIGHT (BMI 25.0-29.9): ICD-10-CM

## 2018-03-16 DIAGNOSIS — M25.511 CHRONIC RIGHT SHOULDER PAIN: ICD-10-CM

## 2018-03-16 DIAGNOSIS — E66.9 OBESITY WITHOUT SERIOUS COMORBIDITY, UNSPECIFIED CLASSIFICATION, UNSPECIFIED OBESITY TYPE: ICD-10-CM

## 2018-03-16 DIAGNOSIS — R53.83 OTHER FATIGUE: Primary | ICD-10-CM

## 2018-03-16 DIAGNOSIS — Z13.0 SCREENING FOR DEFICIENCY ANEMIA: ICD-10-CM

## 2018-03-16 DIAGNOSIS — G89.29 CHRONIC RIGHT SHOULDER PAIN: ICD-10-CM

## 2018-03-16 DIAGNOSIS — Z13.220 SCREENING FOR HYPERLIPIDEMIA: ICD-10-CM

## 2018-03-16 DIAGNOSIS — G89.29 CHRONIC NECK PAIN: ICD-10-CM

## 2018-03-16 DIAGNOSIS — Z13.1 SCREENING FOR DIABETES MELLITUS: ICD-10-CM

## 2018-03-16 DIAGNOSIS — Z13.29 SCREENING FOR THYROID DISORDER: ICD-10-CM

## 2018-03-16 DIAGNOSIS — J30.9 CHRONIC ALLERGIC RHINITIS, UNSPECIFIED SEASONALITY, UNSPECIFIED TRIGGER: ICD-10-CM

## 2018-03-16 DIAGNOSIS — F32.A DEPRESSION, UNSPECIFIED DEPRESSION TYPE: ICD-10-CM

## 2018-03-16 PROCEDURE — 99214 OFFICE O/P EST MOD 30 MIN: CPT | Performed by: NURSE PRACTITIONER

## 2018-03-16 RX ORDER — FEXOFENADINE HCL 180 MG/1
180 TABLET ORAL DAILY
Qty: 30 TABLET | Refills: 5 | Status: SHIPPED | OUTPATIENT
Start: 2018-03-16 | End: 2018-08-16 | Stop reason: SDUPTHER

## 2018-03-16 RX ORDER — LANOLIN ALCOHOL/MO/W.PET/CERES
1000 CREAM (GRAM) TOPICAL DAILY
Qty: 30 TABLET | Refills: 6 | Status: SHIPPED | OUTPATIENT
Start: 2018-03-16 | End: 2018-09-27

## 2018-03-19 RX ORDER — PHENTERMINE HYDROCHLORIDE 37.5 MG/1
37.5 CAPSULE ORAL EVERY MORNING
Qty: 30 CAPSULE | Refills: 2 | Status: SHIPPED | OUTPATIENT
Start: 2018-03-19 | End: 2018-09-27

## 2018-08-15 DIAGNOSIS — Z13.1 SCREENING FOR DIABETES MELLITUS: ICD-10-CM

## 2018-08-15 DIAGNOSIS — Z13.0 SCREENING FOR DEFICIENCY ANEMIA: Primary | ICD-10-CM

## 2018-08-15 DIAGNOSIS — E53.8 LOW SERUM VITAMIN B12: ICD-10-CM

## 2018-08-15 DIAGNOSIS — Z13.220 SCREENING FOR LIPID DISORDERS: ICD-10-CM

## 2018-08-15 DIAGNOSIS — Z13.29 SCREENING FOR THYROID DISORDER: ICD-10-CM

## 2018-08-15 LAB
ALBUMIN SERPL-MCNC: 3.9 G/DL (ref 3.5–5)
ALBUMIN/GLOB SERPL: 1.3 G/DL (ref 1.1–1.8)
ALP SERPL-CCNC: 73 U/L (ref 38–126)
ALT SERPL W P-5'-P-CCNC: 21 U/L
ANION GAP SERPL CALCULATED.3IONS-SCNC: 8 MMOL/L (ref 5–15)
AST SERPL-CCNC: 22 U/L (ref 14–36)
BASOPHILS # BLD AUTO: 0.03 10*3/MM3 (ref 0–0.2)
BASOPHILS NFR BLD AUTO: 0.3 % (ref 0–2)
BILIRUB SERPL-MCNC: 0.2 MG/DL (ref 0.2–1.3)
BUN BLD-MCNC: 13 MG/DL (ref 7–17)
BUN/CREAT SERPL: 13.4 (ref 7–25)
CALCIUM SPEC-SCNC: 9.1 MG/DL (ref 8.4–10.2)
CHLORIDE SERPL-SCNC: 106 MMOL/L (ref 98–107)
CHOLEST SERPL-MCNC: 210 MG/DL (ref 150–200)
CO2 SERPL-SCNC: 28 MMOL/L (ref 22–30)
CREAT BLD-MCNC: 0.97 MG/DL (ref 0.52–1.04)
DEPRECATED RDW RBC AUTO: 40.9 FL (ref 36.4–46.3)
EOSINOPHIL # BLD AUTO: 0.33 10*3/MM3 (ref 0–0.7)
EOSINOPHIL NFR BLD AUTO: 3.6 % (ref 0–7)
ERYTHROCYTE [DISTWIDTH] IN BLOOD BY AUTOMATED COUNT: 13.3 % (ref 11.5–14.5)
FOLATE SERPL-MCNC: 16.7 NG/ML (ref 2.76–21)
GFR SERPL CREATININE-BSD FRML MDRD: 62 ML/MIN/1.73 (ref 58–135)
GLOBULIN UR ELPH-MCNC: 2.9 GM/DL (ref 2.3–3.5)
GLUCOSE BLD-MCNC: 106 MG/DL (ref 74–99)
HCT VFR BLD AUTO: 39.8 % (ref 35–45)
HDLC SERPL-MCNC: 26 MG/DL (ref 40–59)
HGB BLD-MCNC: 12.8 G/DL (ref 12–15.5)
IRON 24H UR-MRATE: 51 MCG/DL (ref 37–170)
IRON SATN MFR SERPL: 12 % (ref 15–50)
LDLC SERPL CALC-MCNC: ABNORMAL MG/DL
LDLC/HDLC SERPL: ABNORMAL {RATIO} (ref 0–3.22)
LYMPHOCYTES # BLD AUTO: 2.93 10*3/MM3 (ref 0.6–4.2)
LYMPHOCYTES NFR BLD AUTO: 32 % (ref 10–50)
MCH RBC QN AUTO: 27.4 PG (ref 26.5–34)
MCHC RBC AUTO-ENTMCNC: 32.2 G/DL (ref 31.4–36)
MCV RBC AUTO: 85.2 FL (ref 80–98)
MONOCYTES # BLD AUTO: 0.69 10*3/MM3 (ref 0–0.9)
MONOCYTES NFR BLD AUTO: 7.5 % (ref 0–12)
NEUTROPHILS # BLD AUTO: 5.17 10*3/MM3 (ref 2–8.6)
NEUTROPHILS NFR BLD AUTO: 56.6 % (ref 37–80)
PLATELET # BLD AUTO: 352 10*3/MM3 (ref 150–450)
PMV BLD AUTO: 9.6 FL (ref 8–12)
POTASSIUM BLD-SCNC: 4 MMOL/L (ref 3.4–5)
PROT SERPL-MCNC: 6.8 G/DL (ref 6.3–8.2)
RBC # BLD AUTO: 4.67 10*6/MM3 (ref 3.77–5.16)
SODIUM BLD-SCNC: 142 MMOL/L (ref 137–145)
T4 FREE SERPL-MCNC: 1.23 NG/DL (ref 0.78–2.19)
TIBC SERPL-MCNC: 413 MCG/DL (ref 265–497)
TRIGL SERPL-MCNC: 486 MG/DL
TSH SERPL DL<=0.05 MIU/L-ACNC: 1.24 MIU/ML (ref 0.46–4.68)
VIT B12 BLD-MCNC: 565 PG/ML (ref 239–931)
VLDLC SERPL-MCNC: ABNORMAL MG/DL
WBC NRBC COR # BLD: 9.15 10*3/MM3 (ref 3.2–9.8)

## 2018-08-15 PROCEDURE — 84443 ASSAY THYROID STIM HORMONE: CPT | Performed by: NURSE PRACTITIONER

## 2018-08-15 PROCEDURE — 82746 ASSAY OF FOLIC ACID SERUM: CPT | Performed by: NURSE PRACTITIONER

## 2018-08-15 PROCEDURE — 82150 ASSAY OF AMYLASE: CPT | Performed by: NURSE PRACTITIONER

## 2018-08-15 PROCEDURE — 82607 VITAMIN B-12: CPT | Performed by: NURSE PRACTITIONER

## 2018-08-15 PROCEDURE — 84439 ASSAY OF FREE THYROXINE: CPT | Performed by: NURSE PRACTITIONER

## 2018-08-15 PROCEDURE — 83540 ASSAY OF IRON: CPT | Performed by: NURSE PRACTITIONER

## 2018-08-15 PROCEDURE — 83690 ASSAY OF LIPASE: CPT | Performed by: NURSE PRACTITIONER

## 2018-08-15 PROCEDURE — 80061 LIPID PANEL: CPT | Performed by: NURSE PRACTITIONER

## 2018-08-15 PROCEDURE — 80053 COMPREHEN METABOLIC PANEL: CPT | Performed by: NURSE PRACTITIONER

## 2018-08-15 PROCEDURE — 85025 COMPLETE CBC W/AUTO DIFF WBC: CPT | Performed by: NURSE PRACTITIONER

## 2018-08-15 PROCEDURE — 83550 IRON BINDING TEST: CPT | Performed by: NURSE PRACTITIONER

## 2018-08-15 PROCEDURE — 82728 ASSAY OF FERRITIN: CPT | Performed by: NURSE PRACTITIONER

## 2018-08-16 ENCOUNTER — OFFICE VISIT (OUTPATIENT)
Dept: FAMILY MEDICINE CLINIC | Facility: CLINIC | Age: 43
End: 2018-08-16

## 2018-08-16 VITALS
DIASTOLIC BLOOD PRESSURE: 92 MMHG | SYSTOLIC BLOOD PRESSURE: 124 MMHG | HEART RATE: 71 BPM | WEIGHT: 178 LBS | OXYGEN SATURATION: 99 % | HEIGHT: 65 IN | BODY MASS INDEX: 29.66 KG/M2

## 2018-08-16 DIAGNOSIS — F32.A DEPRESSIVE DISORDER: ICD-10-CM

## 2018-08-16 DIAGNOSIS — K21.9 GASTROESOPHAGEAL REFLUX DISEASE WITHOUT ESOPHAGITIS: ICD-10-CM

## 2018-08-16 DIAGNOSIS — E78.1 HYPERTRIGLYCERIDEMIA: Primary | ICD-10-CM

## 2018-08-16 DIAGNOSIS — M50.30 DEGENERATIVE DISC DISEASE, CERVICAL: ICD-10-CM

## 2018-08-16 DIAGNOSIS — G89.29 NECK PAIN, CHRONIC: ICD-10-CM

## 2018-08-16 DIAGNOSIS — F41.9 ANXIETY: ICD-10-CM

## 2018-08-16 DIAGNOSIS — D50.9 IRON DEFICIENCY ANEMIA, UNSPECIFIED IRON DEFICIENCY ANEMIA TYPE: Primary | ICD-10-CM

## 2018-08-16 DIAGNOSIS — D50.9 IRON DEFICIENCY ANEMIA, UNSPECIFIED IRON DEFICIENCY ANEMIA TYPE: ICD-10-CM

## 2018-08-16 DIAGNOSIS — M54.2 NECK PAIN, CHRONIC: ICD-10-CM

## 2018-08-16 LAB
AMYLASE SERPL-CCNC: 107 U/L (ref 30–110)
FERRITIN SERPL-MCNC: 7.9 NG/ML (ref 6.2–137)
LIPASE SERPL-CCNC: 146 U/L (ref 23–300)

## 2018-08-16 PROCEDURE — 99214 OFFICE O/P EST MOD 30 MIN: CPT | Performed by: NURSE PRACTITIONER

## 2018-08-16 PROCEDURE — 96372 THER/PROPH/DIAG INJ SC/IM: CPT | Performed by: NURSE PRACTITIONER

## 2018-08-16 RX ORDER — TRAZODONE HYDROCHLORIDE 50 MG/1
50 TABLET ORAL NIGHTLY
Qty: 30 TABLET | Refills: 0 | Status: SHIPPED | OUTPATIENT
Start: 2018-08-16 | End: 2018-08-16

## 2018-08-16 RX ORDER — PAROXETINE HYDROCHLORIDE 20 MG/1
20 TABLET, FILM COATED ORAL EVERY MORNING
Qty: 30 TABLET | Refills: 0 | Status: SHIPPED | OUTPATIENT
Start: 2018-08-16 | End: 2018-09-13 | Stop reason: SDUPTHER

## 2018-08-16 RX ORDER — TRIAMCINOLONE ACETONIDE 40 MG/ML
40 INJECTION, SUSPENSION INTRA-ARTICULAR; INTRAMUSCULAR ONCE
Status: COMPLETED | OUTPATIENT
Start: 2018-08-16 | End: 2018-08-16

## 2018-08-16 RX ORDER — FEXOFENADINE HCL 180 MG/1
180 TABLET ORAL DAILY
Qty: 30 TABLET | Refills: 5 | Status: SHIPPED | OUTPATIENT
Start: 2018-08-16 | End: 2018-09-27 | Stop reason: SDUPTHER

## 2018-08-16 RX ORDER — KETOROLAC TROMETHAMINE 30 MG/ML
60 INJECTION, SOLUTION INTRAMUSCULAR; INTRAVENOUS ONCE
Status: COMPLETED | OUTPATIENT
Start: 2018-08-16 | End: 2018-08-16

## 2018-08-16 RX ORDER — AMITRIPTYLINE HYDROCHLORIDE 25 MG/1
25 TABLET, FILM COATED ORAL NIGHTLY
Qty: 30 TABLET | Refills: 0 | Status: SHIPPED | OUTPATIENT
Start: 2018-08-16 | End: 2018-09-27 | Stop reason: SDUPTHER

## 2018-08-16 RX ORDER — OMEPRAZOLE 20 MG/1
20 CAPSULE, DELAYED RELEASE ORAL DAILY
Qty: 30 CAPSULE | Refills: 0 | Status: SHIPPED | OUTPATIENT
Start: 2018-08-16 | End: 2018-09-27 | Stop reason: SDUPTHER

## 2018-08-16 RX ORDER — FERROUS SULFATE 325(65) MG
325 TABLET ORAL 2 TIMES DAILY
Qty: 60 TABLET | Refills: 0 | Status: SHIPPED | OUTPATIENT
Start: 2018-08-16 | End: 2018-09-27 | Stop reason: SDUPTHER

## 2018-08-16 RX ADMIN — TRIAMCINOLONE ACETONIDE 40 MG: 40 INJECTION, SUSPENSION INTRA-ARTICULAR; INTRAMUSCULAR at 11:26

## 2018-08-16 RX ADMIN — KETOROLAC TROMETHAMINE 60 MG: 30 INJECTION, SOLUTION INTRAMUSCULAR; INTRAVENOUS at 11:26

## 2018-08-20 ENCOUNTER — TELEPHONE (OUTPATIENT)
Dept: FAMILY MEDICINE CLINIC | Facility: CLINIC | Age: 43
End: 2018-08-20

## 2018-08-20 NOTE — TELEPHONE ENCOUNTER
----- Message from GEREMIAS Guerrero sent at 8/16/2018  8:19 PM CDT -----  Lipase and amylase...pancreatic enzymes are normal.  Ferritin is the iron stores and it is no the extremely lower side of normal so I'm prescribing iron to taken twice a day. Will recheck in three mtns with lipids

## 2018-08-30 PROBLEM — K21.9 GASTROESOPHAGEAL REFLUX DISEASE WITHOUT ESOPHAGITIS: Status: ACTIVE | Noted: 2018-08-30

## 2018-08-30 PROBLEM — D50.9 IRON DEFICIENCY ANEMIA: Status: ACTIVE | Noted: 2018-08-30

## 2018-08-30 PROBLEM — M50.30 DEGENERATIVE DISC DISEASE, CERVICAL: Status: ACTIVE | Noted: 2018-08-30

## 2018-08-30 PROBLEM — E78.1 HYPERTRIGLYCERIDEMIA: Status: ACTIVE | Noted: 2018-08-30

## 2018-08-30 NOTE — PROGRESS NOTES
Subjective   Jaimie Akers is a 43 y.o. female who presents to the office to establish care.    History of Present Illness     Reviewed lab work done on 8/15/18 during visit today:  CBC within normal limits.  CMP within normal limits except for fasting blood glucose 106.  Cholesterol panel shows total cholesterol 210, triglycerides 486, HDL 26, LDL unreadable.  Thyroid levels within normal limits.  Iron profile shows iron of 51, TIBC of 413, iron saturation of 12 low.  Folic acid and B12 within normal limits.      History of iron deficiency anemia, iron profile shows that iron saturation is low so we will order a ferritin level and go from there.  Folate within normal limits and B12 within normal limits.  CBC within normal limits as well.    Hypertriglyceridemia-acute problem  -Will collect amylase and lipase and then place patient on TriCor or statin.    Neck pain chronic/degenerative disc disease of the neck.  Chronic-uncontrolled.  -Patient has history of being on gabapentin, states it did not work, patient cannot prescribe this.  States history of bone spurs of the cervical spine.     Anxiety/depression-chronic, uncontrolled  -History of being on benzos, explained to patient I cannot prescribe these.  Patient states failed on Lexapro due to ineffectiveness, tried Wellbutrin in the past but it is not effective as well.  Patient has tried Paxil years and years ago in 2001 and states it didn't work.    GERD-chronic, uncontrolled  -Patient states heartburn worse at nighttime.     The following portions of the patient's history were reviewed and updated as appropriate: allergies, current medications, past family history, past medical history, past social history, past surgical history and problem list.    Review of Systems   Constitutional: Negative for activity change, appetite change, chills, diaphoresis, fatigue, fever and unexpected weight change.   HENT: Negative for congestion, ear discharge, ear  "pain, nosebleeds, postnasal drip, rhinorrhea, sinus pain, sinus pressure, sneezing, sore throat, tinnitus and trouble swallowing.    Eyes: Negative.    Respiratory: Negative for cough, chest tightness, shortness of breath and wheezing.    Cardiovascular: Negative for chest pain, palpitations and leg swelling.   Gastrointestinal: Negative for abdominal distention, abdominal pain, blood in stool, constipation, diarrhea, nausea and vomiting.   Genitourinary: Negative for difficulty urinating, dyspareunia, dysuria, flank pain, frequency, hematuria, menstrual problem, vaginal bleeding, vaginal discharge and vaginal pain.   Musculoskeletal: Positive for neck pain and neck stiffness. Negative for arthralgias, back pain, gait problem, joint swelling and myalgias.   Skin: Negative for rash and wound.   Allergic/Immunologic: Negative for environmental allergies, food allergies and immunocompromised state.   Neurological: Negative for dizziness, tremors, seizures, syncope, weakness, light-headedness, numbness and headaches.   Hematological: Does not bruise/bleed easily.   Psychiatric/Behavioral: Positive for dysphoric mood and sleep disturbance. Negative for behavioral problems, self-injury and suicidal ideas. The patient is nervous/anxious.        Past Medical History:   Diagnosis Date   • Allergic rhinitis    • Anxiety    • Astigmatism    • Blurring of visual image    • Carpal tunnel syndrome    • Depressive disorder    • Endometriosis    • Headache    • Morbid obesity (CMS/HCC)    • Myopia    • Neck pain     djd on MRI   • Obesity    • Ptosis of eyelid        Family History   Problem Relation Age of Onset   • Coronary artery disease Other    • Coronary artery disease Father    • Colon cancer Maternal Grandmother           Objective   /92   Pulse 71   Ht 165.1 cm (65\")   Wt 80.7 kg (178 lb)   SpO2 99%   BMI 29.62 kg/m²   Physical Exam   Constitutional: She is oriented to person, place, and time. She appears " well-developed and well-nourished. No distress.   Cardiovascular: Normal rate, regular rhythm, normal heart sounds and intact distal pulses.  Exam reveals no gallop and no friction rub.    No murmur heard.  Pulmonary/Chest: Effort normal and breath sounds normal. No respiratory distress. She has no wheezes. She has no rales.   Musculoskeletal:        Cervical back: She exhibits decreased range of motion, tenderness, pain and spasm. She exhibits no bony tenderness, no swelling, no edema, no deformity, no laceration and normal pulse.        Back:    Neurological: She is alert and oriented to person, place, and time. She is not disoriented.   Psychiatric: Her speech is normal and behavior is normal. Thought content normal. Her mood appears anxious. She is not actively hallucinating. Cognition and memory are normal. She exhibits a depressed mood.   Patient is dressed appropriately for weather and situation, makes eye contact, and engages in conversation.  She is attentive.   Nursing note and vitals reviewed.       PHQ-2/PHQ-9 Depression Screening 8/16/2018   Little interest or pleasure in doing things 3   Feeling down, depressed, or hopeless 3   Trouble falling or staying asleep, or sleeping too much 3   Feeling tired or having little energy 3   Poor appetite or overeating 3   Feeling bad about yourself - or that you are a failure or have let yourself or your family down 3   Trouble concentrating on things, such as reading the newspaper or watching television 3   Moving or speaking so slowly that other people could have noticed. Or the opposite - being so fidgety or restless that you have been moving around a lot more than usual 0   Thoughts that you would be better off dead, or of hurting yourself in some way 0   Total Score 21   If you checked off any problems, how difficult have these problems made it for you to do your work, take care of things at home, or get along with other people? Extremely dIfficult          Assessment/Plan   Jaimie was seen today for establish care and anxiety.    Diagnoses and all orders for this visit:    Hypertriglyceridemia  -     Lipase  -     Amylase    Iron deficiency anemia, unspecified iron deficiency anemia type  -     Ferritin    Neck pain, chronic  -     amitriptyline (ELAVIL) 25 MG tablet; Take 1 tablet by mouth Every Night.  -     triamcinolone acetonide (KENALOG-40) injection 40 mg; Inject 1 mL into the appropriate muscle as directed by prescriber 1 (One) Time.  -     ketorolac (TORADOL) injection 60 mg; Inject 60 mg into the appropriate muscle as directed by prescriber 1 (One) Time.    Depressive disorder    Anxiety    Degenerative disc disease, cervical    Gastroesophageal reflux disease without esophagitis    Other orders  -     PARoxetine (PAXIL) 20 MG tablet; Take 1 tablet by mouth Every Morning.  -     Discontinue: traZODone (DESYREL) 50 MG tablet; Take 1 tablet by mouth Every Night. For insomnia  -     omeprazole (PRILOSEC) 20 MG capsule; Take 1 capsule by mouth Daily. For heartburn  -     fexofenadine (ALLEGRA) 180 MG tablet; Take 1 tablet by mouth Daily.           Reviewed lab work done on 8/15/18 during visit today:      History of iron deficiency anemia, iron profile shows that iron saturation is low so we will order a ferritin level and go from there.  Folate within normal limits and B12 within normal limits.  CBC within normal limits as well.    Hypertriglyceridemia-acute problem  -Will collect amylase and lipase and then place patient on TriCor or statin.    Neck pain chronic/degenerative disc disease of the neck.  Chronic-uncontrolled.  -History of being on gabapentin but did not work.  Toradol and Kenalog shot given in office.  Prescribed amitriptyline at bedtime.  Follow-up in 2 weeks.    Anxiety/depression-chronic, uncontrolled  -History of being on benzos, explained to patient I cannot prescribe these.  Started on Paxil, amitriptyline at bedtime.  Follow-up  in 2 weeks    GERD-chronic, uncontrolled  -Started on omeprazole 20 mg daily, follow-up in 2 weeks.      Patient educated to follow-up sooner than next scheduled appointment if condition(s) worse or do not improve. Patient states understanding and is in agreeance with plan of care. An After Visit Summary was printed and given to the patient.      GEREMIAS Guerrero        This document has been electronically signed by GEREMIAS Guerrero on August 30, 2018 8:53 AM      EMR/Transcription Dragon Disclaimer:  Some of this note may be an electronic dragon transcription/translation of spoken language to printed text. The electronic translation of spoken language may permit erroneous, or at times, nonsensical words or phrases to be inadvertently transcribed. Although I have reviewed the note for such errors, some may still exist.

## 2018-09-13 ENCOUNTER — TELEPHONE (OUTPATIENT)
Dept: FAMILY MEDICINE CLINIC | Facility: CLINIC | Age: 43
End: 2018-09-13

## 2018-09-13 RX ORDER — PAROXETINE HYDROCHLORIDE 20 MG/1
20 TABLET, FILM COATED ORAL EVERY MORNING
Qty: 30 TABLET | Refills: 0 | Status: SHIPPED | OUTPATIENT
Start: 2018-09-13 | End: 2018-09-27

## 2018-09-13 NOTE — TELEPHONE ENCOUNTER
She will be out of Paxil tomorrow and wants a refill. She has an appointment scheduled on Monday.

## 2018-09-27 ENCOUNTER — OFFICE VISIT (OUTPATIENT)
Dept: FAMILY MEDICINE CLINIC | Facility: CLINIC | Age: 43
End: 2018-09-27

## 2018-09-27 VITALS
OXYGEN SATURATION: 99 % | SYSTOLIC BLOOD PRESSURE: 150 MMHG | TEMPERATURE: 98.7 F | HEIGHT: 65 IN | RESPIRATION RATE: 16 BRPM | BODY MASS INDEX: 28.49 KG/M2 | DIASTOLIC BLOOD PRESSURE: 90 MMHG | HEART RATE: 102 BPM | WEIGHT: 171 LBS

## 2018-09-27 DIAGNOSIS — E78.1 HYPERTRIGLYCERIDEMIA: ICD-10-CM

## 2018-09-27 DIAGNOSIS — K21.9 GASTROESOPHAGEAL REFLUX DISEASE WITHOUT ESOPHAGITIS: ICD-10-CM

## 2018-09-27 DIAGNOSIS — M54.12 BRACHIAL NEURITIS: ICD-10-CM

## 2018-09-27 DIAGNOSIS — F32.A DEPRESSIVE DISORDER: ICD-10-CM

## 2018-09-27 DIAGNOSIS — M54.2 NECK PAIN, CHRONIC: Primary | ICD-10-CM

## 2018-09-27 DIAGNOSIS — G47.00 INSOMNIA, UNSPECIFIED TYPE: ICD-10-CM

## 2018-09-27 DIAGNOSIS — G89.29 NECK PAIN, CHRONIC: Primary | ICD-10-CM

## 2018-09-27 DIAGNOSIS — D50.9 IRON DEFICIENCY ANEMIA, UNSPECIFIED IRON DEFICIENCY ANEMIA TYPE: ICD-10-CM

## 2018-09-27 DIAGNOSIS — F41.9 ANXIETY: ICD-10-CM

## 2018-09-27 DIAGNOSIS — J30.9 CHRONIC ALLERGIC RHINITIS: ICD-10-CM

## 2018-09-27 PROCEDURE — 99214 OFFICE O/P EST MOD 30 MIN: CPT | Performed by: NURSE PRACTITIONER

## 2018-09-27 PROCEDURE — 96372 THER/PROPH/DIAG INJ SC/IM: CPT | Performed by: NURSE PRACTITIONER

## 2018-09-27 RX ORDER — KETOROLAC TROMETHAMINE 30 MG/ML
60 INJECTION, SOLUTION INTRAMUSCULAR; INTRAVENOUS ONCE
Status: COMPLETED | OUTPATIENT
Start: 2018-09-27 | End: 2018-09-27

## 2018-09-27 RX ORDER — FERROUS SULFATE 325(65) MG
325 TABLET ORAL 2 TIMES DAILY
Qty: 60 TABLET | Refills: 5 | Status: SHIPPED | OUTPATIENT
Start: 2018-09-27 | End: 2018-09-27 | Stop reason: SDUPTHER

## 2018-09-27 RX ORDER — OMEPRAZOLE 20 MG/1
20 CAPSULE, DELAYED RELEASE ORAL DAILY
Qty: 30 CAPSULE | Refills: 5 | Status: SHIPPED | OUTPATIENT
Start: 2018-09-27 | End: 2019-03-25 | Stop reason: SDUPTHER

## 2018-09-27 RX ORDER — OMEPRAZOLE 20 MG/1
20 CAPSULE, DELAYED RELEASE ORAL DAILY
Qty: 30 CAPSULE | Refills: 5 | Status: SHIPPED | OUTPATIENT
Start: 2018-09-27 | End: 2018-09-27 | Stop reason: SDUPTHER

## 2018-09-27 RX ORDER — TRAZODONE HYDROCHLORIDE 50 MG/1
50 TABLET ORAL NIGHTLY
Qty: 30 TABLET | Refills: 5 | Status: SHIPPED | OUTPATIENT
Start: 2018-09-27 | End: 2018-09-27 | Stop reason: SDUPTHER

## 2018-09-27 RX ORDER — FEXOFENADINE HCL 180 MG/1
180 TABLET ORAL DAILY
Qty: 30 TABLET | Refills: 5 | Status: SHIPPED | OUTPATIENT
Start: 2018-09-27 | End: 2019-06-04 | Stop reason: SDUPTHER

## 2018-09-27 RX ORDER — PAROXETINE HYDROCHLORIDE 40 MG/1
40 TABLET, FILM COATED ORAL EVERY MORNING
Qty: 30 TABLET | Refills: 5 | Status: SHIPPED | OUTPATIENT
Start: 2018-09-27 | End: 2018-09-27 | Stop reason: SDUPTHER

## 2018-09-27 RX ORDER — FERROUS SULFATE 325(65) MG
325 TABLET ORAL 2 TIMES DAILY
Qty: 60 TABLET | Refills: 5 | Status: SHIPPED | OUTPATIENT
Start: 2018-09-27 | End: 2019-03-25 | Stop reason: SDUPTHER

## 2018-09-27 RX ORDER — AMITRIPTYLINE HYDROCHLORIDE 25 MG/1
25 TABLET, FILM COATED ORAL NIGHTLY
Qty: 30 TABLET | Refills: 5 | Status: SHIPPED | OUTPATIENT
Start: 2018-09-27 | End: 2018-09-27 | Stop reason: SDUPTHER

## 2018-09-27 RX ORDER — TRAZODONE HYDROCHLORIDE 50 MG/1
50 TABLET ORAL NIGHTLY
Qty: 30 TABLET | Refills: 5 | Status: SHIPPED | OUTPATIENT
Start: 2018-09-27 | End: 2019-03-25 | Stop reason: SDUPTHER

## 2018-09-27 RX ORDER — PAROXETINE HYDROCHLORIDE 40 MG/1
40 TABLET, FILM COATED ORAL EVERY MORNING
Qty: 30 TABLET | Refills: 5 | Status: SHIPPED | OUTPATIENT
Start: 2018-09-27 | End: 2019-03-25 | Stop reason: SDUPTHER

## 2018-09-27 RX ORDER — AMITRIPTYLINE HYDROCHLORIDE 25 MG/1
25 TABLET, FILM COATED ORAL NIGHTLY
Qty: 30 TABLET | Refills: 5 | Status: SHIPPED | OUTPATIENT
Start: 2018-09-27 | End: 2019-03-05

## 2018-09-27 RX ADMIN — KETOROLAC TROMETHAMINE 60 MG: 30 INJECTION, SOLUTION INTRAMUSCULAR; INTRAVENOUS at 10:59

## 2018-10-09 PROBLEM — M50.30 DEGENERATIVE DISC DISEASE, CERVICAL: Status: RESOLVED | Noted: 2018-08-30 | Resolved: 2018-10-09

## 2018-10-09 PROBLEM — J30.9 CHRONIC ALLERGIC RHINITIS: Status: ACTIVE | Noted: 2018-10-09

## 2018-10-09 PROBLEM — G47.00 INSOMNIA: Status: ACTIVE | Noted: 2018-10-09

## 2018-10-09 RX ORDER — ATORVASTATIN CALCIUM 20 MG/1
20 TABLET, FILM COATED ORAL NIGHTLY
Qty: 30 TABLET | Refills: 5 | Status: SHIPPED | OUTPATIENT
Start: 2018-10-09 | End: 2019-06-04

## 2018-10-09 NOTE — PROGRESS NOTES
Subjective   Jaimie Akers is a 43 y.o. female who presents to the office for follow-up appointment.    History of Present Illness     Last labs done on 8/15/18: Triglycerides elevated 486, iron saturation low, fasting blood glucose 106.  Due for triglycerides to be rechecked in 3 months and  6 month labs to be done after 2/16/19.    Hypertriglyceridemia/hyperlipidemia-chronic problem, not currently on medication.  -Amylase and lipase collected on 8/15/18 within normal limits.  Patient placed on atorvastatin.  Recheck in 3 months.    Neck pain chronic/degenerative disc disease of the neck, chronic-uncontrolled.  -Patient has history of being on gabapentin, patient states history of bone spurs of the cervical spine.  Patient cannot prescribe gabapentin at this time, will refer to pain management for further control.  Been on amitriptyline before was supposed to follow-up for this but did not.  -States that she has had acute pain since this weekend of the right side of her neck, worse with trying to move it, on the right side.  Patient allergic to muscle relaxers.    Anxiety/depression-chronic, controlled with Paxil.  -History of being on benzos, explained to patient I cannot prescribe these.  Patient states failed on Lexapro due to ineffectiveness, tried Wellbutrin in the past but it is not effective as well.     GERD-chronic, controlled with Prilosec 20 mg daily.    Insomnia-chronic, controlled with trazodone 50 mg at bedtime.    Allergic rhinitis-chronic, controlled with Allegra daily.    Iron deficiency anemia-chronic, controlled with iron daily.       The following portions of the patient's history were reviewed and updated as appropriate: allergies, current medications, past family history, past medical history, past social history, past surgical history and problem list.    Review of Systems   Constitutional: Negative for activity change, appetite change, chills, diaphoresis, fatigue, fever and  "unexpected weight change.   HENT: Negative for congestion, ear discharge, ear pain, nosebleeds, postnasal drip, rhinorrhea, sinus pain, sinus pressure, sneezing, sore throat, tinnitus and trouble swallowing.    Eyes: Negative.    Respiratory: Negative for cough, chest tightness, shortness of breath and wheezing.    Cardiovascular: Negative for chest pain, palpitations and leg swelling.   Gastrointestinal: Negative for abdominal distention, abdominal pain, blood in stool, constipation, diarrhea, nausea and vomiting.   Genitourinary: Negative for difficulty urinating, dyspareunia, dysuria, flank pain, frequency, hematuria, menstrual problem, vaginal bleeding, vaginal discharge and vaginal pain.   Musculoskeletal: Positive for neck pain and neck stiffness. Negative for arthralgias, back pain, gait problem, joint swelling and myalgias.   Skin: Negative for rash and wound.   Allergic/Immunologic: Negative for environmental allergies, food allergies and immunocompromised state.   Neurological: Negative for dizziness, tremors, seizures, syncope, weakness, light-headedness, numbness and headaches.   Hematological: Does not bruise/bleed easily.   Psychiatric/Behavioral: Positive for dysphoric mood and sleep disturbance. Negative for behavioral problems, self-injury and suicidal ideas. The patient is nervous/anxious.        Past Medical History:   Diagnosis Date   • Allergic rhinitis    • Anxiety    • Astigmatism    • Blurring of visual image    • Carpal tunnel syndrome    • Depressive disorder    • Endometriosis    • Headache    • Morbid obesity (CMS/HCC)    • Myopia    • Neck pain     djd on MRI   • Obesity    • Ptosis of eyelid        Family History   Problem Relation Age of Onset   • Coronary artery disease Other    • Coronary artery disease Father    • Colon cancer Maternal Grandmother           Objective   /90   Pulse 102   Temp 98.7 °F (37.1 °C) (Temporal Artery )   Resp 16   Ht 165.1 cm (65\")   Wt 77.6 kg " (171 lb)   SpO2 99%   Breastfeeding? No   BMI 28.46 kg/m²   Physical Exam   Constitutional: She is oriented to person, place, and time. She appears well-developed and well-nourished. No distress.   Cardiovascular: Normal rate, regular rhythm, normal heart sounds and intact distal pulses.  Exam reveals no gallop and no friction rub.    No murmur heard.  Pulmonary/Chest: Effort normal and breath sounds normal. No respiratory distress. She has no wheezes. She has no rales.   Musculoskeletal:        Cervical back: She exhibits decreased range of motion, tenderness, pain and spasm. She exhibits no bony tenderness, no swelling, no edema, no deformity, no laceration and normal pulse.        Back:    Neurological: She is alert and oriented to person, place, and time. She is not disoriented.   Psychiatric: Her speech is normal and behavior is normal. Thought content normal. Her mood appears anxious. She is not actively hallucinating. Cognition and memory are normal. She exhibits a depressed mood.   Patient is dressed appropriately for weather and situation, makes eye contact, and engages in conversation.  She is attentive.   Nursing note and vitals reviewed.       PHQ-2/PHQ-9 Depression Screening 9/27/2018   Little interest or pleasure in doing things 0   Feeling down, depressed, or hopeless 0   Trouble falling or staying asleep, or sleeping too much -   Feeling tired or having little energy -   Poor appetite or overeating -   Feeling bad about yourself - or that you are a failure or have let yourself or your family down -   Trouble concentrating on things, such as reading the newspaper or watching television -   Moving or speaking so slowly that other people could have noticed. Or the opposite - being so fidgety or restless that you have been moving around a lot more than usual -   Thoughts that you would be better off dead, or of hurting yourself in some way -   Total Score 0   If you checked off any problems, how  difficult have these problems made it for you to do your work, take care of things at home, or get along with other people? -         Assessment/Plan   Jaimie was seen today for follow-up.    Diagnoses and all orders for this visit:    Neck pain, chronic  -     Discontinue: amitriptyline (ELAVIL) 25 MG tablet; Take 1 tablet by mouth Every Night.  -     XR Spine Cervical 2 or 3 View  -     amitriptyline (ELAVIL) 25 MG tablet; Take 1 tablet by mouth Every Night.  -     ketorolac (TORADOL) injection 60 mg; Inject 2 mL into the appropriate muscle as directed by prescriber 1 (One) Time.    Iron deficiency anemia, unspecified iron deficiency anemia type  -     Discontinue: ferrous sulfate 325 (65 FE) MG tablet; Take 1 tablet by mouth 2 (Two) Times a Day.  -     ferrous sulfate 325 (65 FE) MG tablet; Take 1 tablet by mouth 2 (Two) Times a Day.    Brachial neuritis  -     Ambulatory Referral to Pain Management  -     ketorolac (TORADOL) injection 60 mg; Inject 2 mL into the appropriate muscle as directed by prescriber 1 (One) Time.    Gastroesophageal reflux disease without esophagitis    Hypertriglyceridemia    Anxiety    Insomnia, unspecified type    Depressive disorder    Chronic allergic rhinitis    Other orders  -     Discontinue: PARoxetine (PAXIL) 40 MG tablet; Take 1 tablet by mouth Every Morning.  -     Discontinue: omeprazole (PRILOSEC) 20 MG capsule; Take 1 capsule by mouth Daily. For heartburn  -     Discontinue: traZODone (DESYREL) 50 MG tablet; Take 1 tablet by mouth Every Night.  -     fexofenadine (ALLEGRA) 180 MG tablet; Take 1 tablet by mouth Daily.  -     omeprazole (PRILOSEC) 20 MG capsule; Take 1 capsule by mouth Daily. For heartburn  -     PARoxetine (PAXIL) 40 MG tablet; Take 1 tablet by mouth Every Morning.  -     traZODone (DESYREL) 50 MG tablet; Take 1 tablet by mouth Every Night.  -     atorvastatin (LIPITOR) 20 MG tablet; Take 1 tablet by mouth Every Night.           Last labs done on 8/15/18:  Triglycerides elevated 486, iron saturation low, fasting blood glucose 106.  Due for triglycerides to be rechecked in 3 months and  6 month labs to be done after 2/16/19.    Hypertriglyceridemia/hyperlipidemia-chronic problem, not currently on medication.  -Amylase and lipase collected on 8/15/18 within normal limits.  Patient placed on atorvastatin.  Recheck in 3 months.    Insomnia-chronic, controlled with trazodone 50 mg at bedtime.    Allergic rhinitis-chronic, controlled with Allegra daily.    Neck pain chronic/degenerative disc disease of the neck.  Chronic-uncontrolled.  -Patient has history of being on gabapentin, patient states history of bone spurs of the cervical spine.  Patient cannot prescribe gabapentin at this time, will refer to pain management for further control.  Been on amitriptyline before was supposed to follow-up for this but did not.  -States that she has had acute pain since this weekend of the right side of her neck, worse with trying to move it, on the right side.  Patient allergic to muscle relaxers.    Anxiety/depression-chronic, controlled with Paxil.  -History of being on benzos, explained to patient I cannot prescribe these.  Patient states failed on Lexapro due to ineffectiveness, tried Wellbutrin in the past but it is not effective as well.     GERD-chronic, controlled with Prilosec 20 mg daily.    Iron deficiency anemia-chronic, controlled with iron daily.       Follow-up after pain management.    Patient educated to follow-up sooner than next scheduled appointment if condition(s) worse or do not improve. Patient states understanding and is in agreeance with plan of care. An After Visit Summary was printed and given to the patient.      GEREMIAS Guerrero        This document has been electronically signed by GEREMIAS Guerrero on October 9, 2018 7:10 PM      EMR/Transcription Dragon Disclaimer:  Some of this note may be an electronic dragon transcription/translation of  spoken language to printed text. The electronic translation of spoken language may permit erroneous, or at times, nonsensical words or phrases to be inadvertently transcribed. Although I have reviewed the note for such errors, some may still exist.

## 2018-10-10 ENCOUNTER — TELEPHONE (OUTPATIENT)
Dept: FAMILY MEDICINE CLINIC | Facility: CLINIC | Age: 43
End: 2018-10-10

## 2018-10-10 NOTE — TELEPHONE ENCOUNTER
----- Message from GEREMIAS Guerrero sent at 10/9/2018  7:01 PM CDT -----  Please call pt, didn't mention at last appt I guess somehow it got missed, we reviewed in august that triglycerides were highand recheck amylase and lipase which were your pancreatic enzymes to make sure you are having acute pancreatitis with this.  Those were normal.  So patient needed to start on atorvastatin somehow that slip to the cracks so I was reviewing her chart and she needs to go on atorvastatin to bring down her triglycerides and cholesterol levels.  It is 20 mg at bedtime, called in to pharmacy, she needs to come in and get her cholesterol checked in about 3 months fasting.  I will put in these orders.  All her to watch for leg cramps if she has these to call me and let me know.

## 2018-10-11 ENCOUNTER — TELEPHONE (OUTPATIENT)
Dept: FAMILY MEDICINE CLINIC | Facility: CLINIC | Age: 43
End: 2018-10-11

## 2018-10-15 ENCOUNTER — TELEPHONE (OUTPATIENT)
Dept: FAMILY MEDICINE CLINIC | Facility: CLINIC | Age: 43
End: 2018-10-15

## 2018-10-15 NOTE — TELEPHONE ENCOUNTER
Will need an appointment to get a script for Gabapentin if approved with Dr Castro. We will call patient to set up an appointment when her guide lines are figured out.

## 2018-11-19 ENCOUNTER — TELEPHONE (OUTPATIENT)
Dept: FAMILY MEDICINE CLINIC | Facility: CLINIC | Age: 43
End: 2018-11-19

## 2018-12-03 DIAGNOSIS — N92.1 EXCESSIVE AND FREQUENT MENSTRUATION WITH IRREGULAR CYCLE: ICD-10-CM

## 2018-12-03 RX ORDER — MEDROXYPROGESTERONE ACETATE 5 MG/1
TABLET ORAL
Qty: 30 TABLET | Refills: 0 | Status: SHIPPED | OUTPATIENT
Start: 2018-12-03 | End: 2019-02-26

## 2019-01-04 ENCOUNTER — TELEPHONE (OUTPATIENT)
Dept: FAMILY MEDICINE CLINIC | Facility: CLINIC | Age: 44
End: 2019-01-04

## 2019-01-04 DIAGNOSIS — D50.9 IRON DEFICIENCY ANEMIA, UNSPECIFIED IRON DEFICIENCY ANEMIA TYPE: Primary | ICD-10-CM

## 2019-01-04 DIAGNOSIS — E78.5 HYPERLIPIDEMIA, UNSPECIFIED HYPERLIPIDEMIA TYPE: ICD-10-CM

## 2019-01-04 NOTE — TELEPHONE ENCOUNTER
Colleen wanted her to have her Lipids and Iron checked after 3 months of starting cholesterol and iron meds. She will be seeing you on Wed. Can you put in the appropriate labs for her please?

## 2019-01-07 DIAGNOSIS — N92.1 EXCESSIVE AND FREQUENT MENSTRUATION WITH IRREGULAR CYCLE: ICD-10-CM

## 2019-01-08 RX ORDER — MEDROXYPROGESTERONE ACETATE 5 MG/1
TABLET ORAL
Qty: 30 TABLET | Refills: 0 | OUTPATIENT
Start: 2019-01-08

## 2019-02-26 ENCOUNTER — OFFICE VISIT (OUTPATIENT)
Dept: FAMILY MEDICINE CLINIC | Facility: CLINIC | Age: 44
End: 2019-02-26

## 2019-02-26 VITALS
WEIGHT: 187 LBS | OXYGEN SATURATION: 98 % | BODY MASS INDEX: 31.16 KG/M2 | RESPIRATION RATE: 16 BRPM | TEMPERATURE: 98.5 F | HEIGHT: 65 IN | HEART RATE: 78 BPM | SYSTOLIC BLOOD PRESSURE: 122 MMHG | DIASTOLIC BLOOD PRESSURE: 70 MMHG

## 2019-02-26 DIAGNOSIS — F41.9 ANXIETY: ICD-10-CM

## 2019-02-26 DIAGNOSIS — G89.29 CHRONIC PAIN OF BOTH SHOULDERS: ICD-10-CM

## 2019-02-26 DIAGNOSIS — D50.9 IRON DEFICIENCY ANEMIA, UNSPECIFIED IRON DEFICIENCY ANEMIA TYPE: ICD-10-CM

## 2019-02-26 DIAGNOSIS — Z13.0 SCREENING FOR DEFICIENCY ANEMIA: ICD-10-CM

## 2019-02-26 DIAGNOSIS — Z13.29 SCREENING FOR THYROID DISORDER: ICD-10-CM

## 2019-02-26 DIAGNOSIS — G89.29 NECK PAIN, CHRONIC: ICD-10-CM

## 2019-02-26 DIAGNOSIS — Z13.220 SCREENING FOR HYPERLIPIDEMIA: ICD-10-CM

## 2019-02-26 DIAGNOSIS — M25.511 CHRONIC PAIN OF BOTH SHOULDERS: ICD-10-CM

## 2019-02-26 DIAGNOSIS — Z79.899 LONG TERM USE OF DRUG: Primary | ICD-10-CM

## 2019-02-26 DIAGNOSIS — Z86.2 HISTORY OF IRON DEFICIENCY ANEMIA: ICD-10-CM

## 2019-02-26 DIAGNOSIS — M25.512 CHRONIC PAIN OF BOTH SHOULDERS: ICD-10-CM

## 2019-02-26 DIAGNOSIS — K21.9 GASTROESOPHAGEAL REFLUX DISEASE WITHOUT ESOPHAGITIS: ICD-10-CM

## 2019-02-26 DIAGNOSIS — E66.9 OBESITY WITHOUT SERIOUS COMORBIDITY, UNSPECIFIED CLASSIFICATION, UNSPECIFIED OBESITY TYPE: ICD-10-CM

## 2019-02-26 DIAGNOSIS — F32.A DEPRESSIVE DISORDER: ICD-10-CM

## 2019-02-26 DIAGNOSIS — G47.00 INSOMNIA, UNSPECIFIED TYPE: ICD-10-CM

## 2019-02-26 DIAGNOSIS — Z13.1 SCREENING FOR DIABETES MELLITUS: ICD-10-CM

## 2019-02-26 DIAGNOSIS — M54.2 NECK PAIN, CHRONIC: ICD-10-CM

## 2019-02-26 DIAGNOSIS — E78.1 HYPERTRIGLYCERIDEMIA: ICD-10-CM

## 2019-02-26 LAB
ALBUMIN SERPL-MCNC: 4.6 G/DL (ref 3.5–5)
ALBUMIN/GLOB SERPL: 1.5 G/DL (ref 1.1–1.8)
ALP SERPL-CCNC: 90 U/L (ref 38–126)
ALT SERPL W P-5'-P-CCNC: 27 U/L
AMPHET+METHAMPHET UR QL: NEGATIVE
ANION GAP SERPL CALCULATED.3IONS-SCNC: 10 MMOL/L (ref 5–15)
AST SERPL-CCNC: 25 U/L (ref 14–36)
BARBITURATES UR QL SCN: NEGATIVE
BASOPHILS # BLD AUTO: 0.02 10*3/MM3 (ref 0–0.2)
BASOPHILS NFR BLD AUTO: 0.3 % (ref 0–2)
BENZODIAZ UR QL SCN: NEGATIVE
BILIRUB SERPL-MCNC: 0.5 MG/DL (ref 0.2–1.3)
BUN BLD-MCNC: 15 MG/DL (ref 7–17)
BUN/CREAT SERPL: 12 (ref 7–25)
CALCIUM SPEC-SCNC: 9.1 MG/DL (ref 8.4–10.2)
CANNABINOIDS SERPL QL: NEGATIVE
CHLORIDE SERPL-SCNC: 101 MMOL/L (ref 98–107)
CHOLEST SERPL-MCNC: 224 MG/DL (ref 150–200)
CO2 SERPL-SCNC: 28 MMOL/L (ref 22–30)
COCAINE UR QL: NEGATIVE
CREAT BLD-MCNC: 1.25 MG/DL (ref 0.52–1.04)
DEPRECATED RDW RBC AUTO: 42.2 FL (ref 36.4–46.3)
EOSINOPHIL # BLD AUTO: 0.05 10*3/MM3 (ref 0–0.7)
EOSINOPHIL NFR BLD AUTO: 0.7 % (ref 0–7)
ERYTHROCYTE [DISTWIDTH] IN BLOOD BY AUTOMATED COUNT: 13 % (ref 11.5–14.5)
FERRITIN SERPL-MCNC: 32.7 NG/ML (ref 6.2–137)
GFR SERPL CREATININE-BSD FRML MDRD: 46 ML/MIN/1.73 (ref 58–135)
GLOBULIN UR ELPH-MCNC: 3.1 GM/DL (ref 2.3–3.5)
GLUCOSE BLD-MCNC: 101 MG/DL (ref 74–99)
HCT VFR BLD AUTO: 45.9 % (ref 35–45)
HDLC SERPL-MCNC: 28 MG/DL (ref 40–59)
HGB BLD-MCNC: 15.5 G/DL (ref 12–15.5)
IRON 24H UR-MRATE: 79 MCG/DL (ref 37–170)
IRON SATN MFR SERPL: 25 % (ref 15–50)
LDLC SERPL CALC-MCNC: 119 MG/DL
LDLC/HDLC SERPL: 4.26 {RATIO} (ref 0–3.22)
LYMPHOCYTES # BLD AUTO: 2.37 10*3/MM3 (ref 0.6–4.2)
LYMPHOCYTES NFR BLD AUTO: 31.1 % (ref 10–50)
MCH RBC QN AUTO: 30.5 PG (ref 26.5–34)
MCHC RBC AUTO-ENTMCNC: 33.8 G/DL (ref 31.4–36)
MCV RBC AUTO: 90.4 FL (ref 80–98)
METHADONE UR QL SCN: NEGATIVE
MONOCYTES # BLD AUTO: 0.42 10*3/MM3 (ref 0–0.9)
MONOCYTES NFR BLD AUTO: 5.5 % (ref 0–12)
NEUTROPHILS # BLD AUTO: 4.75 10*3/MM3 (ref 2–8.6)
NEUTROPHILS NFR BLD AUTO: 62.4 % (ref 37–80)
OPIATES UR QL: NEGATIVE
OXYCODONE UR QL SCN: NEGATIVE
PLATELET # BLD AUTO: 257 10*3/MM3 (ref 150–450)
PMV BLD AUTO: 9.4 FL (ref 8–12)
POTASSIUM BLD-SCNC: 4.4 MMOL/L (ref 3.4–5)
PROT SERPL-MCNC: 7.7 G/DL (ref 6.3–8.2)
RBC # BLD AUTO: 5.08 10*6/MM3 (ref 3.77–5.16)
SODIUM BLD-SCNC: 139 MMOL/L (ref 137–145)
T4 FREE SERPL-MCNC: 0.97 NG/DL (ref 0.78–2.19)
TIBC SERPL-MCNC: 322 MCG/DL (ref 265–497)
TRIGL SERPL-MCNC: 383 MG/DL
TSH SERPL DL<=0.05 MIU/L-ACNC: 0.92 MIU/ML (ref 0.46–4.68)
VLDLC SERPL-MCNC: 76.6 MG/DL
WBC NRBC COR # BLD: 7.61 10*3/MM3 (ref 3.2–9.8)

## 2019-02-26 PROCEDURE — 80307 DRUG TEST PRSMV CHEM ANLYZR: CPT | Performed by: NURSE PRACTITIONER

## 2019-02-26 PROCEDURE — 80061 LIPID PANEL: CPT | Performed by: NURSE PRACTITIONER

## 2019-02-26 PROCEDURE — 83550 IRON BINDING TEST: CPT | Performed by: NURSE PRACTITIONER

## 2019-02-26 PROCEDURE — 82728 ASSAY OF FERRITIN: CPT | Performed by: NURSE PRACTITIONER

## 2019-02-26 PROCEDURE — 83540 ASSAY OF IRON: CPT | Performed by: NURSE PRACTITIONER

## 2019-02-26 PROCEDURE — 80053 COMPREHEN METABOLIC PANEL: CPT | Performed by: NURSE PRACTITIONER

## 2019-02-26 PROCEDURE — 85025 COMPLETE CBC W/AUTO DIFF WBC: CPT | Performed by: NURSE PRACTITIONER

## 2019-02-26 PROCEDURE — 84443 ASSAY THYROID STIM HORMONE: CPT | Performed by: NURSE PRACTITIONER

## 2019-02-26 PROCEDURE — 99214 OFFICE O/P EST MOD 30 MIN: CPT | Performed by: NURSE PRACTITIONER

## 2019-02-26 PROCEDURE — 84439 ASSAY OF FREE THYROXINE: CPT | Performed by: NURSE PRACTITIONER

## 2019-02-26 RX ORDER — GABAPENTIN 300 MG/1
300 CAPSULE ORAL 3 TIMES DAILY
Qty: 90 CAPSULE | Refills: 0 | Status: SHIPPED | OUTPATIENT
Start: 2019-02-26 | End: 2019-06-04 | Stop reason: SDUPTHER

## 2019-02-28 RX ORDER — OMEPRAZOLE 20 MG/1
CAPSULE, DELAYED RELEASE ORAL
Qty: 30 CAPSULE | Refills: 5 | OUTPATIENT
Start: 2019-02-28

## 2019-02-28 RX ORDER — TRAZODONE HYDROCHLORIDE 50 MG/1
50 TABLET ORAL NIGHTLY
Qty: 30 TABLET | Refills: 5 | OUTPATIENT
Start: 2019-02-28

## 2019-02-28 RX ORDER — FERROUS SULFATE 325(65) MG
TABLET ORAL
Qty: 60 TABLET | Refills: 5 | OUTPATIENT
Start: 2019-02-28

## 2019-03-05 ENCOUNTER — TELEPHONE (OUTPATIENT)
Dept: FAMILY MEDICINE CLINIC | Facility: CLINIC | Age: 44
End: 2019-03-05

## 2019-03-05 NOTE — TELEPHONE ENCOUNTER
----- Message from GEREMIAS Guerrero sent at 3/1/2019  2:14 PM CST -----  CBC looks at WBC counts for infection and RBC/hgb/hct for anemias and other blood disorders, all of these labs were normal.   CMP looks at your kidney function, liver function, electrolytes, and a preliminary test for diabetes, all of these labs were normal except kidney function mildly elevated, drink lots of water, avoid tylenol and nsaids, recheck in 3 mtns please.   Chol is still not within normal limits so lets double atorvastatin to 40mg at hs, recheck in three mtns FASTING  Iron levels are doing good so continue iron bid.   Thyroid levels good.   F/u in three mtns after labs    Orders:   Lipid panel and CMP as future after 5/1/19 FASTING, appt mid May 2019  Increase atorvastatin to 40mg at hs, #30, 3 refills  (remind me to put these in or you can pend them to me)

## 2019-03-05 NOTE — TELEPHONE ENCOUNTER
Spoke to pt about labs already her Kidney function must be normal in order to start the phentermine     Recheck the labs in 1 month reminding pt not ot take NSAIDS and to drink plenty of water.

## 2019-03-25 DIAGNOSIS — D50.9 IRON DEFICIENCY ANEMIA, UNSPECIFIED IRON DEFICIENCY ANEMIA TYPE: ICD-10-CM

## 2019-03-25 DIAGNOSIS — R79.89 ELEVATED SERUM CREATININE: Primary | ICD-10-CM

## 2019-03-25 RX ORDER — PAROXETINE HYDROCHLORIDE 40 MG/1
40 TABLET, FILM COATED ORAL EVERY MORNING
Qty: 30 TABLET | Refills: 0 | Status: SHIPPED | OUTPATIENT
Start: 2019-03-25 | End: 2019-06-04

## 2019-03-25 RX ORDER — FERROUS SULFATE 325(65) MG
TABLET ORAL
Qty: 60 TABLET | Refills: 0 | Status: SHIPPED | OUTPATIENT
Start: 2019-03-25 | End: 2019-06-04

## 2019-03-25 RX ORDER — GABAPENTIN 300 MG/1
CAPSULE ORAL
Qty: 90 CAPSULE | Refills: 0 | OUTPATIENT
Start: 2019-03-25

## 2019-03-25 RX ORDER — OMEPRAZOLE 20 MG/1
CAPSULE, DELAYED RELEASE ORAL
Qty: 30 CAPSULE | Refills: 0 | Status: SHIPPED | OUTPATIENT
Start: 2019-03-25 | End: 2019-06-04 | Stop reason: SDUPTHER

## 2019-03-25 RX ORDER — TRAZODONE HYDROCHLORIDE 50 MG/1
50 TABLET ORAL NIGHTLY
Qty: 30 TABLET | Refills: 0 | Status: SHIPPED | OUTPATIENT
Start: 2019-03-25 | End: 2019-06-04 | Stop reason: SDUPTHER

## 2019-06-03 ENCOUNTER — TELEPHONE (OUTPATIENT)
Dept: FAMILY MEDICINE CLINIC | Facility: CLINIC | Age: 44
End: 2019-06-03

## 2019-06-03 ENCOUNTER — LAB (OUTPATIENT)
Dept: LAB | Facility: OTHER | Age: 44
End: 2019-06-03

## 2019-06-03 DIAGNOSIS — R79.89 ELEVATED SERUM CREATININE: ICD-10-CM

## 2019-06-03 LAB
ALBUMIN SERPL-MCNC: 4 G/DL (ref 3.5–5)
ALBUMIN/GLOB SERPL: 1.3 G/DL (ref 1.1–1.8)
ALP SERPL-CCNC: 91 U/L (ref 38–126)
ALT SERPL W P-5'-P-CCNC: 38 U/L
ANION GAP SERPL CALCULATED.3IONS-SCNC: 7 MMOL/L (ref 5–15)
AST SERPL-CCNC: 33 U/L (ref 14–36)
BILIRUB SERPL-MCNC: 0.2 MG/DL (ref 0.2–1.3)
BUN BLD-MCNC: 10 MG/DL (ref 7–23)
BUN/CREAT SERPL: 10.8 (ref 7–25)
CALCIUM SPEC-SCNC: 9.2 MG/DL (ref 8.4–10.2)
CHLORIDE SERPL-SCNC: 107 MMOL/L (ref 101–112)
CO2 SERPL-SCNC: 30 MMOL/L (ref 22–30)
CREAT BLD-MCNC: 0.93 MG/DL (ref 0.52–1.04)
GFR SERPL CREATININE-BSD FRML MDRD: 65 ML/MIN/1.73 (ref 58–135)
GLOBULIN UR ELPH-MCNC: 3.1 GM/DL (ref 2.3–3.5)
GLUCOSE BLD-MCNC: 129 MG/DL (ref 70–99)
POTASSIUM BLD-SCNC: 3.7 MMOL/L (ref 3.4–5)
PROT SERPL-MCNC: 7.1 G/DL (ref 6.3–8.6)
SODIUM BLD-SCNC: 144 MMOL/L (ref 137–145)

## 2019-06-03 PROCEDURE — 80053 COMPREHEN METABOLIC PANEL: CPT | Performed by: NURSE PRACTITIONER

## 2019-06-03 NOTE — TELEPHONE ENCOUNTER
----- Message from GEREMIAS Guerrero sent at 6/3/2019  2:00 PM CDT -----  Creatinine back to normal :)

## 2019-06-04 ENCOUNTER — OFFICE VISIT (OUTPATIENT)
Dept: FAMILY MEDICINE CLINIC | Facility: CLINIC | Age: 44
End: 2019-06-04

## 2019-06-04 ENCOUNTER — OFFICE VISIT (OUTPATIENT)
Dept: OBSTETRICS AND GYNECOLOGY | Facility: CLINIC | Age: 44
End: 2019-06-04

## 2019-06-04 VITALS
HEART RATE: 77 BPM | WEIGHT: 185.8 LBS | DIASTOLIC BLOOD PRESSURE: 84 MMHG | SYSTOLIC BLOOD PRESSURE: 138 MMHG | BODY MASS INDEX: 30.96 KG/M2 | HEIGHT: 65 IN

## 2019-06-04 VITALS
WEIGHT: 186 LBS | RESPIRATION RATE: 16 BRPM | BODY MASS INDEX: 30.99 KG/M2 | HEIGHT: 65 IN | SYSTOLIC BLOOD PRESSURE: 128 MMHG | DIASTOLIC BLOOD PRESSURE: 80 MMHG | HEART RATE: 76 BPM | OXYGEN SATURATION: 99 % | TEMPERATURE: 97.8 F

## 2019-06-04 DIAGNOSIS — J30.9 CHRONIC ALLERGIC RHINITIS: ICD-10-CM

## 2019-06-04 DIAGNOSIS — K21.9 GASTROESOPHAGEAL REFLUX DISEASE WITHOUT ESOPHAGITIS: ICD-10-CM

## 2019-06-04 DIAGNOSIS — D50.9 IRON DEFICIENCY ANEMIA, UNSPECIFIED IRON DEFICIENCY ANEMIA TYPE: ICD-10-CM

## 2019-06-04 DIAGNOSIS — M54.2 NECK PAIN, CHRONIC: ICD-10-CM

## 2019-06-04 DIAGNOSIS — E66.9 OBESITY WITHOUT SERIOUS COMORBIDITY, UNSPECIFIED CLASSIFICATION, UNSPECIFIED OBESITY TYPE: ICD-10-CM

## 2019-06-04 DIAGNOSIS — Z13.6 SCREENING FOR HEART DISEASE: Primary | ICD-10-CM

## 2019-06-04 DIAGNOSIS — G47.00 INSOMNIA, UNSPECIFIED TYPE: ICD-10-CM

## 2019-06-04 DIAGNOSIS — Z12.31 SCREENING MAMMOGRAM, ENCOUNTER FOR: ICD-10-CM

## 2019-06-04 DIAGNOSIS — F41.1 ANXIETY STATE: ICD-10-CM

## 2019-06-04 DIAGNOSIS — N92.1 EXCESSIVE AND FREQUENT MENSTRUATION WITH IRREGULAR CYCLE: Primary | ICD-10-CM

## 2019-06-04 DIAGNOSIS — G89.29 NECK PAIN, CHRONIC: ICD-10-CM

## 2019-06-04 DIAGNOSIS — F32.A DEPRESSIVE DISORDER: ICD-10-CM

## 2019-06-04 PROCEDURE — 99214 OFFICE O/P EST MOD 30 MIN: CPT | Performed by: NURSE PRACTITIONER

## 2019-06-04 PROCEDURE — 99213 OFFICE O/P EST LOW 20 MIN: CPT | Performed by: NURSE PRACTITIONER

## 2019-06-04 RX ORDER — FERROUS SULFATE 325(65) MG
325 TABLET ORAL
Qty: 30 TABLET | Refills: 5 | Status: SHIPPED | OUTPATIENT
Start: 2019-06-04 | End: 2019-09-23

## 2019-06-04 RX ORDER — GABAPENTIN 300 MG/1
300 CAPSULE ORAL 3 TIMES DAILY
Qty: 90 CAPSULE | Refills: 0 | Status: SHIPPED | OUTPATIENT
Start: 2019-06-04 | End: 2019-07-02 | Stop reason: SDUPTHER

## 2019-06-04 RX ORDER — OMEPRAZOLE 20 MG/1
20 CAPSULE, DELAYED RELEASE ORAL DAILY
Qty: 30 CAPSULE | Refills: 5 | Status: SHIPPED | OUTPATIENT
Start: 2019-06-04 | End: 2019-09-23 | Stop reason: SDUPTHER

## 2019-06-04 RX ORDER — TRAZODONE HYDROCHLORIDE 50 MG/1
50 TABLET ORAL NIGHTLY
Qty: 30 TABLET | Refills: 5 | Status: SHIPPED | OUTPATIENT
Start: 2019-06-04 | End: 2019-09-23

## 2019-06-04 RX ORDER — BACLOFEN 10 MG/1
10 TABLET ORAL 3 TIMES DAILY PRN
Qty: 90 TABLET | Refills: 0 | Status: SHIPPED | OUTPATIENT
Start: 2019-06-04 | End: 2019-07-02 | Stop reason: SDUPTHER

## 2019-06-04 RX ORDER — FEXOFENADINE HCL 180 MG/1
180 TABLET ORAL DAILY
Qty: 30 TABLET | Refills: 5 | Status: SHIPPED | OUTPATIENT
Start: 2019-06-04 | End: 2019-09-23 | Stop reason: SDUPTHER

## 2019-06-04 RX ORDER — PHENTERMINE HYDROCHLORIDE 37.5 MG/1
37.5 CAPSULE ORAL EVERY MORNING
Qty: 30 CAPSULE | Refills: 0 | Status: SHIPPED | OUTPATIENT
Start: 2019-06-04 | End: 2019-07-02

## 2019-06-04 RX ORDER — DIPHENHYDRAMINE HCL 25 MG
TABLET ORAL
Qty: 30 TABLET | Refills: 5 | Status: SHIPPED | OUTPATIENT
Start: 2019-06-04 | End: 2019-09-23

## 2019-06-04 RX ORDER — MEDROXYPROGESTERONE ACETATE 5 MG/1
5 TABLET ORAL DAILY
Qty: 30 TABLET | Refills: 0 | Status: SHIPPED | OUTPATIENT
Start: 2019-06-04 | End: 2019-06-26 | Stop reason: SDUPTHER

## 2019-06-04 NOTE — PROGRESS NOTES
Subjective   Jaimie Akers is a 44 y.o. female.     History of Present Illness   Pt presents for refills on Provera 5mg for excessive menstruation. Pt started provera in 2016 after normal US and labs and she did well. She ran out a few months ago after canceling her appointments. She did not follow through with mammogram. No records found of last mammogram. Pt states it was approximately 5 years ago at [facility]. Pt states she is currently bleeding heavily and would like to postpone her pap test until bleeding is under control.     Pt denies being sexually active but is ready for consultation on permanent sterilization and endometrial ablation. Bleeding is light to heavy, bleeding months at a time. Anemic with iron deficiency.     The following portions of the patient's history were reviewed and updated as appropriate: allergies, current medications, past family history, past medical history, past social history, past surgical history and problem list.    Review of Systems   Constitutional: Negative.  Negative for chills and fever.   Respiratory: Negative.    Cardiovascular: Negative.    Genitourinary: Positive for menstrual problem and vaginal bleeding. Negative for pelvic pain.   Neurological: Negative for dizziness, syncope and light-headedness.       Objective    Vitals:    06/04/19 1005   BP: 138/84   Pulse: 77         06/04/19  1005   Weight: 84.3 kg (185 lb 12.8 oz)     Body mass index is 30.92 kg/m².    Physical Exam   Constitutional: She is oriented to person, place, and time. She appears well-developed and well-nourished.   Cardiovascular: Normal rate, regular rhythm and normal heart sounds.   Pulmonary/Chest: Effort normal and breath sounds normal.   Genitourinary:   Genitourinary Comments: Exam and pap smear deferred as she is bleeding heavily today.    Neurological: She is alert and oriented to person, place, and time.   Psychiatric: She has a normal mood and affect. Her behavior is  normal.   Vitals reviewed.        Assessment/Plan   Jaimie was seen today for excessive and frequent mentruation with irregular cycle.    Diagnoses and all orders for this visit:    Excessive and frequent menstruation with irregular cycle  -     medroxyPROGESTERone (PROVERA) 5 MG tablet; Take 1 tablet by mouth Daily.    Screening mammogram, encounter for  -     Mammo Screening Digital Tomosynthesis Bilateral With CAD; Future    I expressed the importance of getting a mammogram annually after 38. Pt voices understanding. I recommend we start back on the provera daily, schedule MMG and well woman in 2 weeks, then refer to OB/GYN for surgical consultation on BTL and endometrial ablation.     Pt agrees with this plan of care. Visits scheduled for 6/18/19.

## 2019-06-20 NOTE — PROGRESS NOTES
Subjective   Jaimie Akers is a 44 y.o. female who presents to the office for follow-up appointment.    Last labs done on 82/26/19.     Neck pain chronic with radiculopathy/degenerative disc disease of the neck, chronic-improving on gabapentin 300mg tid.   -Patient has history of being on gabapentin, patient states history of bone spurs of the cervical spine. Patient allergic to muscle relaxers robaxin and flexeril states one makes her really sleepy and one makes her have a rash. Pt has seen pain management in the past, now seeing me for gabapentin.   -Ed reviewed today, contract signed, urine drug screen done.  Patient here for one mtn f/u.     Anxiety/depression-chronic, controlled without medication.   -History of being on benzos, explained to patient I cannot prescribe these.  Patient states failed on Lexapro due to ineffectiveness, tried Wellbutrin in the past but it is not effective as well.   -hx of being on paxil but weaned herself off of it, states felt like she was in a fog and now she is more mentally alert, anxiety is better, and she has more energy.     Overweight-chronic, uncontrolled with diet.  -EKG up to date, ed reviewed, contract on file, UDS up to date. Pt has been on phentermine and would like to restart it. Is going ot work on diet as well.      Past Medical History:  GERD-chronic, controlled with Prilosec 20 mg daily.  Insomnia-chronic, controlled with trazodone 50 mg at bedtime.  Allergic rhinitis-chronic, controlled with Allegra daily.  Iron deficiency anemia-chronic, controlled with iron daily.    Hypertriglyceridemia/hyperlipidemia-chronic problem, improving with atorvastatin.     The following portions of the patient's history were reviewed and updated as appropriate: allergies, current medications, past family history, past medical history, past social history, past surgical history and problem list.    ed reviewed on 6/4/19: hx of being on gabapentin 300mg, 90  count, by jelani on 10/11/18 and myself on 2/26/19. lortab once 7.5mg 60 count on 10/11/18 by jelani. Otherwise negative marleen.     Review of Systems   Constitutional: Negative for activity change, appetite change, chills, diaphoresis, fatigue, fever and unexpected weight change.   HENT: Negative for congestion, ear discharge, ear pain, nosebleeds, postnasal drip, rhinorrhea, sinus pressure, sinus pain, sneezing, sore throat, tinnitus and trouble swallowing.    Eyes: Negative.    Respiratory: Negative for cough, chest tightness, shortness of breath and wheezing.    Cardiovascular: Negative for chest pain, palpitations and leg swelling.   Gastrointestinal: Negative for abdominal distention, abdominal pain, blood in stool, constipation, diarrhea, nausea and vomiting.   Genitourinary: Negative for difficulty urinating, dyspareunia, dysuria, flank pain, frequency, hematuria, menstrual problem, vaginal bleeding, vaginal discharge and vaginal pain.   Musculoskeletal: Positive for neck pain and neck stiffness. Negative for arthralgias, back pain, gait problem, joint swelling and myalgias.   Skin: Negative for rash and wound.   Allergic/Immunologic: Negative for environmental allergies, food allergies and immunocompromised state.   Neurological: Negative for dizziness, tremors, seizures, syncope, weakness, light-headedness, numbness and headaches.   Hematological: Does not bruise/bleed easily.   Psychiatric/Behavioral: Negative for behavioral problems, dysphoric mood, self-injury, sleep disturbance and suicidal ideas. The patient is not nervous/anxious.        Past Medical History:   Diagnosis Date   • Allergic rhinitis    • Anxiety    • Astigmatism    • Blurring of visual image    • Carpal tunnel syndrome    • Depressive disorder    • Endometriosis    • Headache    • Morbid obesity (CMS/HCC)    • Myopia    • Neck pain     djd on MRI   • Obesity    • Ptosis of eyelid        Family History   Problem Relation Age of  "Onset   • Coronary artery disease Other    • Coronary artery disease Father    • Colon cancer Maternal Grandmother           Objective   /80   Pulse 76   Temp 97.8 °F (36.6 °C) (Temporal)   Resp 16   Ht 165.1 cm (65\")   Wt 84.4 kg (186 lb)   LMP 05/08/2019 (Approximate)   SpO2 99%   Breastfeeding? No   BMI 30.95 kg/m²   Physical Exam   Constitutional: She is oriented to person, place, and time. She appears well-developed and well-nourished. No distress.   Cardiovascular: Normal rate, regular rhythm, normal heart sounds and intact distal pulses. Exam reveals no gallop and no friction rub.   No murmur heard.  Pulmonary/Chest: Effort normal and breath sounds normal. No respiratory distress. She has no wheezes. She has no rales.   Musculoskeletal:        Cervical back: She exhibits decreased range of motion, tenderness, pain and spasm. She exhibits no bony tenderness, no swelling, no edema, no deformity, no laceration and normal pulse.        Back:    Neurological: She is alert and oriented to person, place, and time. She is not disoriented.   Psychiatric: Her speech is normal and behavior is normal. Thought content normal. Her mood appears not anxious. She is not actively hallucinating. Cognition and memory are normal. She does not exhibit a depressed mood.   Patient is dressed appropriately for weather and situation, makes eye contact, and engages in conversation.  She is attentive.   Nursing note and vitals reviewed.       PHQ-2/PHQ-9 Depression Screening 2/26/2019   Little interest or pleasure in doing things 3   Feeling down, depressed, or hopeless 1   Trouble falling or staying asleep, or sleeping too much 3   Feeling tired or having little energy 3   Poor appetite or overeating 3   Feeling bad about yourself - or that you are a failure or have let yourself or your family down 0   Trouble concentrating on things, such as reading the newspaper or watching television 3   Moving or speaking so slowly " that other people could have noticed. Or the opposite - being so fidgety or restless that you have been moving around a lot more than usual 0   Thoughts that you would be better off dead, or of hurting yourself in some way 0   Total Score 16   If you checked off any problems, how difficult have these problems made it for you to do your work, take care of things at home, or get along with other people? Somewhat difficult         Assessment/Plan   Jaimie was seen today for med refill.    Diagnoses and all orders for this visit:    Screening for heart disease  -     ECG 12 Lead    Iron deficiency anemia, unspecified iron deficiency anemia type    Neck pain, chronic    Obesity without serious comorbidity, unspecified classification, unspecified obesity type    Gastroesophageal reflux disease without esophagitis    Insomnia, unspecified type    Depressive disorder    Anxiety state    Chronic allergic rhinitis    Other orders  -     traZODone (DESYREL) 50 MG tablet; Take 1 tablet by mouth Every Night.  -     fexofenadine (ALLEGRA) 180 MG tablet; Take 1 tablet by mouth Daily.  -     ferrous sulfate 325 (65 FE) MG tablet; Take 1 tablet by mouth Daily With Breakfast.  -     omeprazole (priLOSEC) 20 MG capsule; Take 1 capsule by mouth Daily.  -     gabapentin (NEURONTIN) 300 MG capsule; Take 1 capsule by mouth 3 (Three) Times a Day.  -     baclofen (LIORESAL) 10 MG tablet; Take 1 tablet by mouth 3 (Three) Times a Day As Needed for Muscle Spasms.  -     diphenhydrAMINE (BENADRYL ALLERGY) 25 MG tablet; Take two tablets with onset of rash then one tablet every six hrs if needed.  -     phentermine 37.5 MG capsule; Take 1 capsule by mouth Every Morning.           Neck pain chronic with radiculopathy/degenerative disc disease of the neck, chronic-improving on gabapentin 300mg tid.   -refill gabapentin x three adam. marleen reviewed today, UDS up to date, contract on file.  -Patient understands the risks associated with this  controlled medication, including tolerance and addiction.  she also agrees to only obtain this medication from me or my collaborating physician, Dr. Suzan Castro, and not from a another provider, unless that provider is covering for me in my absence.  she also agrees to be compliant in dosing, and not self adjust the dose of medication.  A signed controlled substance agreement is on file, and she has received a controlled substance education sheet at this visit.  she has also signed a consent for treatment with a controlled substance as per [facility] policy. CIELO was obtained.    Anxiety/depression-chronic, controlled without medication.     Overweight-chronic, uncontrolled with diet.  -EKG done today and wncielo lambert reviewed, contract on file, UDS up to date. Pt has been on phentermine and would like to restart it. Is going ot work on diet as well.   -refilled phentermine x one mtn. Pt understands must be seen mtly, must lose weight, vacation after three mtn period and stopping after BMI below 27.   -Patient understands the risks associated with this controlled medication, including tolerance and addiction.  she also agrees to only obtain this medication from me or my collaborating physician, Dr. Suzan Castro, and not from a another provider, unless that provider is covering for me in my absence.  she also agrees to be compliant in dosing, and not self adjust the dose of medication.  A signed controlled substance agreement is on file, and she has received a controlled substance education sheet at this visit.  she has also signed a consent for treatment with a controlled substance as per [facility] policy. CIELO was obtained.    Refills provided.     EKG:  Indication: screening for heart disease  Vent Rate: 66 bpm  MEKHI:  168 ms  QRS:  94 ms  QT/QTc: 422/442 ms  Interpretation: NSR, normal EKG  Previous EKG: N/A      F/u in one mtn for refill on gabapentin.     Patient educated to follow-up sooner  than next scheduled appointment if condition(s) worse or do not improve. Patient states understanding and is in agreeance with plan of care. An After Visit Summary was printed and given to the patient.      GEREMIAS Guerrero        This document has been electronically signed by GEREMIAS Guerrero on June 19, 2019 10:42 PM      EMR/Transcription Dragon Disclaimer:  Some of this note may be an electronic dragon transcription/translation of spoken language to printed text. The electronic translation of spoken language may permit erroneous, or at times, nonsensical words or phrases to be inadvertently transcribed. Although I have reviewed the note for such errors, some may still exist.

## 2019-06-26 ENCOUNTER — LAB (OUTPATIENT)
Dept: LAB | Facility: OTHER | Age: 44
End: 2019-06-26

## 2019-06-26 ENCOUNTER — OFFICE VISIT (OUTPATIENT)
Dept: OBSTETRICS AND GYNECOLOGY | Facility: CLINIC | Age: 44
End: 2019-06-26

## 2019-06-26 VITALS
DIASTOLIC BLOOD PRESSURE: 88 MMHG | WEIGHT: 185.4 LBS | SYSTOLIC BLOOD PRESSURE: 142 MMHG | HEART RATE: 69 BPM | HEIGHT: 65 IN | BODY MASS INDEX: 30.89 KG/M2

## 2019-06-26 DIAGNOSIS — Z01.419 ENCOUNTER FOR ROUTINE GYNECOLOGICAL EXAMINATION WITH PAPANICOLAOU SMEAR OF CERVIX: Primary | ICD-10-CM

## 2019-06-26 DIAGNOSIS — D50.9 IRON DEFICIENCY ANEMIA, UNSPECIFIED IRON DEFICIENCY ANEMIA TYPE: ICD-10-CM

## 2019-06-26 DIAGNOSIS — Z01.419 ENCOUNTER FOR ROUTINE GYNECOLOGICAL EXAMINATION WITH PAPANICOLAOU SMEAR OF CERVIX: ICD-10-CM

## 2019-06-26 DIAGNOSIS — N92.1 EXCESSIVE AND FREQUENT MENSTRUATION WITH IRREGULAR CYCLE: ICD-10-CM

## 2019-06-26 PROCEDURE — G0123 SCREEN CERV/VAG THIN LAYER: HCPCS | Performed by: NURSE PRACTITIONER

## 2019-06-26 PROCEDURE — 99396 PREV VISIT EST AGE 40-64: CPT | Performed by: NURSE PRACTITIONER

## 2019-06-26 RX ORDER — MEDROXYPROGESTERONE ACETATE 10 MG/1
10 TABLET ORAL DAILY
Qty: 30 TABLET | Refills: 5 | Status: SHIPPED | OUTPATIENT
Start: 2019-06-26 | End: 2020-04-19 | Stop reason: SDUPTHER

## 2019-06-26 NOTE — PROGRESS NOTES
"Subjective   Chief Complaint   Patient presents with   • Gynecologic Exam     well woman annual visit     Jaimie Akers is a 44 y.o. year old  presenting to be seen for her annual exam.  Pt still has concerns with excessive and prolonged menstruation. Pt started provera in  after normal US and labs and she did well. She ran out a few months ago after canceling her appointments. She did not follow through with mammogram. No records found of last mammogram. Pt states it was approximately 5 years ago at [facility]. Pt postponed her pap test a couple of weeks in order to get bleeding to slow down when she started provera. Pt denies being sexually active but is ready for consultation on permanent sterilization and endometrial ablation. Bleeding is light to heavy, bleeding months at a time. Anemic with iron deficiency.    Patient's last menstrual period was 2019 (approximate). Has been bleeding 3 months straight    OB History      Para Term  AB Living    1 1 1          SAB TAB Ectopic Molar Multiple Live Births                         Current birth control method: none.     She has not been sexually active in \"a very long time.\"  In the past 12 months there has not been new sexual partners.  Condoms are not typically used.  She would not like to be screened for STD's at today's exam.     Past 6 month menstrual history:    Cycle Frequency: unpredictable  irregular  constant   Menstrual cycle character: light to heavy   Cycle Duration: Varies, prolonged    Number of heavy days of flows: Varies   Dysmenorrhea: none and is not affecting her activities of daily living   PMS: none and is not affecting her activities of daily living   Intermenstrual bleeding present: yes   Post-coital bleeding present: yes     She exercises regularly: yes.  She wears her seat belt:yes.  She has concerns about domestic violence: no.  Last colonoscopy: Never  Last DEXA: Never  Last MM, had " "one completed this morning, results pending  Last pap: 3/29/17  History of abnormal PAP: No    The following portions of the patient's history were reviewed and updated as appropriate:problem list, current medications, allergies, past family history, past medical history, past social history and past surgical history.    Social History    Tobacco Use      Smoking status: Never Smoker      Smokeless tobacco: Never Used    Social History     Substance and Sexual Activity   Alcohol Use No      Review of Systems   Constitutional: Negative.  Negative for chills and fever. Unexpected weight change: difficulty losing weight.   Respiratory: Negative.    Cardiovascular: Negative.    Gastrointestinal: Negative.  Negative for constipation and diarrhea.   Endocrine: Negative.  Negative for cold intolerance and heat intolerance.   Genitourinary: Positive for menstrual problem (see HPI). Negative for dyspareunia, pelvic pain, vaginal discharge and vaginal pain.   Skin: Negative.    Neurological: Negative for dizziness, syncope, light-headedness and headaches.   Psychiatric/Behavioral: Negative for suicidal ideas. The patient is not nervous/anxious.         Anxiety and depression, well managed         Objective   /88   Pulse 69   Ht 165.1 cm (65\")   Wt 84.1 kg (185 lb 6.4 oz)   LMP 06/26/2019 (Approximate) Comment: bleeding for 3-4 months  Breastfeeding? No   BMI 30.85 kg/m²     General:  well developed; well nourished  no acute distress   Skin:  No suspicious lesions seen   Thyroid: normal to inspection and palpation   Breasts:  Examined in supine position  Symmetric without masses or skin dimpling  Nipples normal without inversion, lesions or discharge  There are no palpable axillary nodes   Abdomen: soft, non-tender; no masses  no umbilical or inginual hernias are present  no hepato-splenomegaly  Normal findings: bowel sounds normal   Cardiac: Heart sounds are normal.  Regular rate and rhythm without murmur, gallop " "or rub.   Resp: Normal expansion.  Clear to auscultation.  No rales, rhonchi, or wheezing.   Psych: alert,oriented, in NAD with a full range of affect, normal behavior and no psychotic features   Pelvis: Uterus:  Clinical staff was present for exam  External genitalia:  normal appearance of the external genitalia including Bartholin's and [place]'s glands.  :  urethral meatus normal; urethral hypermobility is absent.  Vaginal:  normal pink mucosa without prolapse or lesions and blood present -  small amount and dark red  Cervix:  normal appearance.  Uterus:  normal size, shape and consistency  Adnexa:  normal bimanual exam of the adnexa.     Lab Review   CBC, CMP, TSH and lipid, ferritin     Imaging  No data reviewed    Jaimie was seen today for gynecologic exam.    Diagnoses and all orders for this visit:    Encounter for routine gynecological examination with Papanicolaou smear of cervix  -     Liquid-based Pap Smear, Screening; Future    Excessive and frequent menstruation with irregular cycle  -     medroxyPROGESTERone (PROVERA) 10 MG tablet; Take 1 tablet by mouth Daily.  -     Ambulatory Referral to Obstetrics / Gynecology    Pap results: I will send card in mail or call if abnormal. RTC annually for well woman exams. Pt's bleeding is light today but she is still having \"gushing\" episodes at times. Will increase to provera 10mg once daily until she can establish with OB/GYN and develop a plan. Referral placed to Dr. Gomez or Deonte in [place] per pt request for consideration of BTL and endometrial ablation.  Pt agrees with the plan of care.     This note was electronically signed.    Iram Echols, APRN  June 26, 2019  "

## 2019-06-28 LAB
GEN CATEG CVX/VAG CYTO-IMP: NORMAL
LAB AP CASE REPORT: NORMAL
LAB AP GYN ADDITIONAL INFORMATION: NORMAL
PATH INTERP SPEC-IMP: NORMAL
STAT OF ADQ CVX/VAG CYTO-IMP: NORMAL

## 2019-07-02 ENCOUNTER — OFFICE VISIT (OUTPATIENT)
Dept: FAMILY MEDICINE CLINIC | Facility: CLINIC | Age: 44
End: 2019-07-02

## 2019-07-02 VITALS
RESPIRATION RATE: 14 BRPM | DIASTOLIC BLOOD PRESSURE: 70 MMHG | BODY MASS INDEX: 31.16 KG/M2 | HEIGHT: 65 IN | SYSTOLIC BLOOD PRESSURE: 110 MMHG | HEART RATE: 66 BPM | TEMPERATURE: 97.2 F | WEIGHT: 187 LBS | OXYGEN SATURATION: 100 %

## 2019-07-02 DIAGNOSIS — M25.511 CHRONIC PAIN OF BOTH SHOULDERS: ICD-10-CM

## 2019-07-02 DIAGNOSIS — F41.1 ANXIETY STATE: Primary | ICD-10-CM

## 2019-07-02 DIAGNOSIS — J30.9 CHRONIC ALLERGIC RHINITIS: ICD-10-CM

## 2019-07-02 DIAGNOSIS — F32.A DEPRESSIVE DISORDER: ICD-10-CM

## 2019-07-02 DIAGNOSIS — H10.13 ACUTE ALLERGIC CONJUNCTIVITIS OF BOTH EYES: ICD-10-CM

## 2019-07-02 DIAGNOSIS — G89.29 CHRONIC PAIN OF BOTH SHOULDERS: ICD-10-CM

## 2019-07-02 DIAGNOSIS — G89.29 NECK PAIN, CHRONIC: ICD-10-CM

## 2019-07-02 DIAGNOSIS — M54.2 NECK PAIN, CHRONIC: ICD-10-CM

## 2019-07-02 DIAGNOSIS — M25.512 CHRONIC PAIN OF BOTH SHOULDERS: ICD-10-CM

## 2019-07-02 DIAGNOSIS — E66.9 OBESITY WITHOUT SERIOUS COMORBIDITY, UNSPECIFIED CLASSIFICATION, UNSPECIFIED OBESITY TYPE: ICD-10-CM

## 2019-07-02 PROCEDURE — 99214 OFFICE O/P EST MOD 30 MIN: CPT | Performed by: NURSE PRACTITIONER

## 2019-07-02 RX ORDER — GABAPENTIN 300 MG/1
300 CAPSULE ORAL 3 TIMES DAILY
Qty: 90 CAPSULE | Refills: 0 | Status: SHIPPED | OUTPATIENT
Start: 2019-07-02 | End: 2019-07-09 | Stop reason: SDUPTHER

## 2019-07-02 RX ORDER — PHENTERMINE HYDROCHLORIDE 30 MG/1
30 CAPSULE ORAL EVERY MORNING
Qty: 30 CAPSULE | Refills: 0 | Status: CANCELLED | OUTPATIENT
Start: 2019-07-02

## 2019-07-02 RX ORDER — BACLOFEN 20 MG/1
20 TABLET ORAL 3 TIMES DAILY PRN
Qty: 90 TABLET | Refills: 0 | Status: SHIPPED | OUTPATIENT
Start: 2019-07-02 | End: 2019-07-09

## 2019-07-02 RX ORDER — BUPROPION HYDROCHLORIDE 75 MG/1
75 TABLET ORAL DAILY
Qty: 30 TABLET | Refills: 0 | Status: SHIPPED | OUTPATIENT
Start: 2019-07-02 | End: 2019-07-18 | Stop reason: ALTCHOICE

## 2019-07-02 RX ORDER — KETOTIFEN FUMARATE 0.35 MG/ML
1 SOLUTION/ DROPS OPHTHALMIC 2 TIMES DAILY
Qty: 10 ML | Refills: 3 | Status: SHIPPED | OUTPATIENT
Start: 2019-07-02 | End: 2019-07-09

## 2019-07-02 NOTE — PROGRESS NOTES
"Subjective   Jaimie Ignacia Akers is a 44 y.o. female who presents to the office for 1 month follow up on phentermine.    Last labs completed on 2/26/19: Tsh/ft4-wnl; Lipid panel- , HDL 28, triglycerides 383, total cholesterol 224; CMP- elevated fasting glucose 129 otherwise wnl.     Neck pain chronic with radiculopathy/degenerative disc disease of the neck- chronic, uncontrolled with gabapentin 300mg tid.   -Patient has history of being on gabapentin, patient states history of bone spurs of the cervical spine. Patient allergic to muscle relaxers robaxin and flexeril states one makes her really sleepy and one makes her have a rash. Pt has seen pain management in the past, now seeing me for gabapentin.   -Ed reviewed today, contract signed at last visit, urine drug screen completed at last visit.  Patient here for one mtn f/u.   -Baclofen 10mg TID PRN for neck tightness/pain. Reports neck pain is still not improving and feels tight. Patient asked about Valium for muscle tightness-told patient this was not an indication for Valium. Patient would like to be referred to Physical Therapy for Chronic neck pain. States physical therapy helped in the past.     Anxiety/depression-chronic, uncontrolled without medication.   -History of being on benzos, explained to patient I cannot prescribe these. hx of being on paxil but weaned herself off of it, states felt like she was in a fog. Patient states failed on Lexapro due to ineffectiveness, tried Wellbutrin in the past and cannot remembers feeling depressed still in \"[place]\". Is okay with restarting/trying Wellbutrin again. Denies homicidal thoughts, suicidal thoughts, manic episodes. States is depressed due to weight gain and chronic pain.     Overweight-chronic, uncontrolled with diet and phentermine. Patient was restarted on phentermine at last visit a month ago. States phentermine was started on 6/4/19 and was diagnosed with the flu on 6/10/19 and has missed " about a weeks worth of medication due to illness. Does complain of headache, but is unsure as to whether it was r/t to medication or illness.   -No weight loss at first month follow up; Actually has gained 2 lbs.   -EKG up to date, marleen reviewed, contract on file, UDS up to date.    Chronic allergic rhinitis/acute allergic conjunctivitis of both eyes- uncontrolled with allegra 180mg daily. Patient c/o blurry vision, itchy watery eyes, runny nose. Denies fever, chills, malaise, sore throat, cough, sinus pressure/congestion, or shortness of air.     Reports heavy bleeding and is awaiting for surgery for ablation/tubal in [place].     Marleen reviewed today on 7/2/19: 10/11/18 Gabapentin 300mg #90 and [place] 7.5mg by Jelani. 2/26/19 Gabapentin 300mg and 6/4/19 #90 refilled by me. 6/4/19 phentermine 37.5mg #30 refilled by me. Otherwise clean Marleen.     Past Medical History:  GERD-chronic, controlled with Prilosec 20 mg daily.  Insomnia-chronic, controlled with trazodone 50 mg at bedtime.  Iron deficiency anemia-chronic, controlled with iron daily.    Hypertriglyceridemia/hyperlipidemia-chronic problem, improving with atorvastatin.     The following portions of the patient's history were reviewed and updated as appropriate: allergies, current medications, past family history, past medical history, past social history, past surgical history and problem list.    marleen reviewed on 6/4/19: hx of being on gabapentin 300mg, 90 count, by jelani on 10/11/18 and myself on 2/26/19. lortab once 7.5mg 60 count on 10/11/18 by jelani. Otherwise negative marleen.     Review of Systems   Constitutional: Negative for activity change, appetite change, chills, diaphoresis, fatigue, fever and unexpected weight change.   HENT: Positive for postnasal drip and rhinorrhea. Negative for congestion, ear discharge, ear pain, nosebleeds, sinus pressure, sinus pain, sneezing, sore throat, tinnitus and trouble swallowing.    Eyes:  "Positive for redness, itching and visual disturbance. Negative for photophobia, pain and discharge.   Respiratory: Negative for cough, chest tightness, shortness of breath and wheezing.    Cardiovascular: Negative for chest pain, palpitations and leg swelling.   Gastrointestinal: Negative for abdominal distention, abdominal pain, blood in stool, constipation, diarrhea, nausea and vomiting.   Genitourinary: Negative for difficulty urinating, dyspareunia, dysuria, flank pain, frequency, hematuria, menstrual problem, vaginal bleeding, vaginal discharge and vaginal pain.   Musculoskeletal: Positive for neck pain and neck stiffness. Negative for arthralgias, back pain, gait problem, joint swelling and myalgias.   Skin: Negative for rash and wound.   Allergic/Immunologic: Negative for environmental allergies, food allergies and immunocompromised state.   Neurological: Negative for dizziness, tremors, seizures, syncope, weakness, light-headedness, numbness and headaches.   Hematological: Does not bruise/bleed easily.   Psychiatric/Behavioral: Positive for dysphoric mood. Negative for behavioral problems, self-injury, sleep disturbance and suicidal ideas. The patient is nervous/anxious.        Past Medical History:   Diagnosis Date   • Allergic rhinitis    • Anxiety    • Astigmatism    • Blurring of visual image    • Carpal tunnel syndrome    • Depressive disorder    • Endometriosis    • Headache    • Morbid obesity (CMS/HCC)    • Myopia    • Neck pain     djd on MRI   • Obesity    • Ptosis of eyelid        Family History   Problem Relation Age of Onset   • Coronary artery disease Other    • Coronary artery disease Father    • Colon cancer Maternal Grandmother           Objective   /70   Pulse 66   Temp 97.2 °F (36.2 °C) (Temporal)   Resp 14   Ht 165.1 cm (65\")   Wt 84.8 kg (187 lb)   LMP 06/26/2019 (Approximate) Comment: bleeding for 3-4 months  SpO2 100%   Breastfeeding? No   BMI 31.12 kg/m²   Physical Exam "   Constitutional: She is oriented to person, place, and time. Vital signs are normal. She appears well-developed and well-nourished. No distress.   HENT:   Head: Normocephalic and atraumatic.   Right Ear: Hearing, tympanic membrane, external ear and ear canal normal.   Left Ear: Hearing, tympanic membrane, external ear and ear canal normal.   Nose: Mucosal edema and rhinorrhea present. Right sinus exhibits no maxillary sinus tenderness and no frontal sinus tenderness. Left sinus exhibits no maxillary sinus tenderness and no frontal sinus tenderness.   Mouth/Throat: No oropharyngeal exudate, posterior oropharyngeal edema or posterior oropharyngeal erythema.   Post-nasal drip observed   Eyes: EOM are normal. Pupils are equal, round, and reactive to light. Right eye exhibits chemosis. Right eye exhibits no discharge. Left eye exhibits chemosis. Left eye exhibits no discharge. No scleral icterus.   Neck: Neck supple. No JVD present. Spinous process tenderness and muscular tenderness present. Edema and decreased range of motion present. No tracheal deviation present. No thyromegaly present.   Cardiovascular: Normal rate, regular rhythm, normal heart sounds and intact distal pulses. Exam reveals no gallop and no friction rub.   No murmur heard.  Pulmonary/Chest: Effort normal and breath sounds normal. No respiratory distress. She has no wheezes. She has no rales.   Abdominal: Soft. Bowel sounds are normal. She exhibits no distension and no mass. There is no tenderness. There is no rebound and no guarding. No hernia.   Musculoskeletal:        Cervical back: She exhibits tenderness, pain and spasm. She exhibits no bony tenderness, no swelling, no edema, no deformity, no laceration and normal pulse.        Back:    Lymphadenopathy:     She has no cervical adenopathy.   Neurological: She is alert and oriented to person, place, and time. She is not disoriented. She displays a negative Romberg sign. Coordination and gait  normal.   Skin: Skin is warm and dry. Capillary refill takes less than 2 seconds. No rash noted. No erythema. No pallor.   Psychiatric: She has a normal mood and affect. Her speech is normal and behavior is normal. Judgment and thought content normal. Her mood appears not anxious. She is not actively hallucinating. Cognition and memory are normal. She does not exhibit a depressed mood.   Patient is dressed appropriately for weather and situation, makes eye contact, and engages in conversation.  She is attentive.   Nursing note and vitals reviewed.       PHQ-2/PHQ-9 Depression Screening 7/9/2019   Little interest or pleasure in doing things 0   Feeling down, depressed, or hopeless 0   Trouble falling or staying asleep, or sleeping too much -   Feeling tired or having little energy -   Poor appetite or overeating -   Feeling bad about yourself - or that you are a failure or have let yourself or your family down -   Trouble concentrating on things, such as reading the newspaper or watching television -   Moving or speaking so slowly that other people could have noticed. Or the opposite - being so fidgety or restless that you have been moving around a lot more than usual -   Thoughts that you would be better off dead, or of hurting yourself in some way -   Total Score 0   If you checked off any problems, how difficult have these problems made it for you to do your work, take care of things at home, or get along with other people? -         Assessment/Plan   Jaimie was seen today for follow-up.    Diagnoses and all orders for this visit:    Anxiety state  -     buPROPion (WELLBUTRIN) 75 MG tablet; Take 1 tablet by mouth Daily.    Depressive disorder  -     buPROPion (WELLBUTRIN) 75 MG tablet; Take 1 tablet by mouth Daily.    Obesity without serious comorbidity, unspecified classification, unspecified obesity type  -     Discontinue: Phentermine HCl 30 MG tablet dispersible; Take 1 tablet by mouth Daily.    Chronic  allergic rhinitis  -     FLONASE 50 MCG/ACT nasal spray; 2 sprays into the nostril(s) as directed by provider Daily.    Neck pain, chronic  -     Ambulatory Referral to Physical Therapy  -     Discontinue: gabapentin (NEURONTIN) 300 MG capsule; Take 1 capsule by mouth 3 (Three) Times a Day.  -     Discontinue: baclofen (LIORESAL) 20 MG tablet; Take 1 tablet by mouth 3 (Three) Times a Day As Needed for Muscle Spasms.    Chronic pain of both shoulders  -     Ambulatory Referral to Physical Therapy  -     Discontinue: gabapentin (NEURONTIN) 300 MG capsule; Take 1 capsule by mouth 3 (Three) Times a Day.  -     Discontinue: baclofen (LIORESAL) 20 MG tablet; Take 1 tablet by mouth 3 (Three) Times a Day As Needed for Muscle Spasms.    Acute allergic conjunctivitis of both eyes  -     Discontinue: ketotifen (ZADITOR) 0.025 % ophthalmic solution; Administer 1 drop to both eyes 2 (Two) Times a Day. For itchy eyes    Other orders  -     Cancel: phentermine 30 MG capsule; Take 1 capsule by mouth Every Morning.  -     diclofenac (VOLTAREN) 1 % gel gel; Apply 4 g topically to the appropriate area as directed 2 (Two) Times a Day. For arthritic pain          Last labs completed on 2/26/19: Tsh/ft4-wnl; Lipid panel- , HDL 28, triglycerides 383, total cholesterol 224; CMP- elevated fasting glucose 129 otherwise wnl.     Neck pain chronic with radiculopathy/degenerative disc disease of the neck- chronic, uncontrolled with gabapentin 300mg tid  - Pt has seen pain management in the past, now seeing me for gabapentin. Gabapentin refilled today.  -Ed reviewed today, contract signed at last visit, urine drug screen completed at last visit.  Patient here for one mtn f/u.   -Baclofen increased to 20mg TID PRN for neck tightness/pain, ordered diclofenac gel for arthritic pain.  -Referral placed for Physical Therapy at [facility]. Educated ordered was placed and would be called with referral appointment.   Patient asked about Valium for  "muscle tightness-told patient this was not an indication for Valium.  -Patient understands the risks associated with this controlled medication, including tolerance and addiction.  she also agrees to only obtain this medication from me or my collaborating physician, Dr. Suzan Castro, and not from a another provider, unless that provider is covering for me in my absence.  she also agrees to be compliant in dosing, and not self adjust the dose of medication.  A signed controlled substance agreement is on file, and she has received a controlled substance education sheet at this visit.  she has also signed a consent for treatment with a controlled substance as per [facility] policy. CIELO was obtained.    Anxiety/depression-chronic, uncontrolled without medication.   -History of being on benzos, explained to patient I cannot prescribe these. hx of being on paxil but weaned herself off of it, states felt like she was in a fog. Patient states failed on Lexapro due to ineffectiveness, tried Wellbutrin in the past and cannot remembers feeling depressed still in \"[place]\". Is okay with restarting/trying Wellbutrin again.   -Ordered Wellbutrin 75mg daily. Patient educated on dosing, administration, and side effects; educated to not miss dose, take at same time every day, and not to abruptly stop. Patient verbalized understanding of education.     Overweight-chronic, uncontrolled with diet and phentermine. Patient was restarted on phentermine at last visit a month ago. States phentermine was started on 6/4/19 and was diagnosed with the flu on 6/10/19 and has missed about a weeks worth of medication due to illness. Does complain of headache, but is unsure as to whether it was r/t to medication or illness.   -No weight loss at first month follow up; Actually has gained 2 lbs. Pt told she will have to lose weight at next visit or no more phentermine.  -EKG up to date, cielo reviewed, contract on file, UDS up to " date.  -Refilled phentermine 30mg, changed to tablet from capsule. Patient educated that is does not lose weight at next visit, will stop phentermine.  Pt understands must be seen mtly, must lose weight, vacation after three mtn period and stopping after BMI below 27.   -Patient understands the risks associated with this controlled medication, including tolerance and addiction.  she also agrees to only obtain this medication from me or my collaborating physician, Dr. Suzan Castro, and not from a another provider, unless that provider is covering for me in my absence.  she also agrees to be compliant in dosing, and not self adjust the dose of medication.  A signed controlled substance agreement is on file, and she has received a controlled substance education sheet at this visit.  she has also signed a consent for treatment with a controlled substance as per [facility] policy. CIELO was obtained.    Chronic allergic rhinitis/acute allergic conjunctivitis of both eyes- uncontrolled with allegra 180mg daily.    -Ordered Flonase and Zaditor.     Reports heavy bleeding and is awaiting for surgery for ablation/tubal in [place].     Cielo reviewed today on 7/2/19: 10/11/18 Gabapentin 300mg #90 and [place] 7.5mg by Colstrip. 2/26/19 Gabapentin 300mg and 6/4/19 #90 refilled by me. 6/4/19 phentermine 37.5mg #30 refilled by me. Otherwise clean Cielo.     Follow up: 1 month    Patient educated to follow-up sooner than next scheduled appointment if condition(s) worse or do not improve. Patient states understanding and is in agreeance with plan of care. An After Visit Summary was printed and given to the patient.      GEREMIAS Guerrero        This document has been electronically signed by GEREMIAS Guerrero on July 18, 2019 8:08 AM      EMR/Transcription Dragon Disclaimer:  Some of this note may be an electronic dragon transcription/translation of spoken language to printed text. The electronic translation  of spoken language may permit erroneous, or at times, nonsensical words or phrases to be inadvertently transcribed. Although I have reviewed the note for such errors, some may still exist.

## 2019-07-09 ENCOUNTER — OFFICE VISIT (OUTPATIENT)
Dept: FAMILY MEDICINE CLINIC | Facility: CLINIC | Age: 44
End: 2019-07-09

## 2019-07-09 VITALS
WEIGHT: 184.7 LBS | BODY MASS INDEX: 30.77 KG/M2 | OXYGEN SATURATION: 98 % | RESPIRATION RATE: 16 BRPM | DIASTOLIC BLOOD PRESSURE: 78 MMHG | HEART RATE: 68 BPM | SYSTOLIC BLOOD PRESSURE: 122 MMHG | TEMPERATURE: 99.2 F | HEIGHT: 65 IN

## 2019-07-09 DIAGNOSIS — M25.512 CHRONIC PAIN OF BOTH SHOULDERS: ICD-10-CM

## 2019-07-09 DIAGNOSIS — F41.1 ANXIETY STATE: ICD-10-CM

## 2019-07-09 DIAGNOSIS — R73.9 HYPERGLYCEMIA: ICD-10-CM

## 2019-07-09 DIAGNOSIS — M25.511 CHRONIC PAIN OF BOTH SHOULDERS: ICD-10-CM

## 2019-07-09 DIAGNOSIS — G89.29 NECK PAIN, CHRONIC: ICD-10-CM

## 2019-07-09 DIAGNOSIS — E66.3 OVERWEIGHT (BMI 25.0-29.9): ICD-10-CM

## 2019-07-09 DIAGNOSIS — E78.1 HYPERTRIGLYCERIDEMIA: Primary | ICD-10-CM

## 2019-07-09 DIAGNOSIS — E66.9 OBESITY WITHOUT SERIOUS COMORBIDITY, UNSPECIFIED CLASSIFICATION, UNSPECIFIED OBESITY TYPE: ICD-10-CM

## 2019-07-09 DIAGNOSIS — E53.8 LOW SERUM VITAMIN B12: ICD-10-CM

## 2019-07-09 DIAGNOSIS — D50.9 IRON DEFICIENCY ANEMIA, UNSPECIFIED IRON DEFICIENCY ANEMIA TYPE: ICD-10-CM

## 2019-07-09 DIAGNOSIS — G89.29 CHRONIC PAIN OF BOTH SHOULDERS: ICD-10-CM

## 2019-07-09 DIAGNOSIS — M54.2 NECK PAIN, CHRONIC: ICD-10-CM

## 2019-07-09 PROCEDURE — 99214 OFFICE O/P EST MOD 30 MIN: CPT | Performed by: NURSE PRACTITIONER

## 2019-07-09 RX ORDER — GABAPENTIN 300 MG/1
300 CAPSULE ORAL 3 TIMES DAILY
Qty: 90 CAPSULE | Refills: 0 | Status: SHIPPED | OUTPATIENT
Start: 2019-07-09 | End: 2019-07-09 | Stop reason: SDUPTHER

## 2019-07-09 RX ORDER — GABAPENTIN 300 MG/1
300 CAPSULE ORAL 3 TIMES DAILY
Qty: 90 CAPSULE | Refills: 0 | Status: SHIPPED | OUTPATIENT
Start: 2019-07-09 | End: 2019-07-18 | Stop reason: ALTCHOICE

## 2019-07-09 RX ORDER — HYDROCODONE BITARTRATE AND ACETAMINOPHEN 5; 325 MG/1; MG/1
1 TABLET ORAL EVERY 8 HOURS PRN
Qty: 30 TABLET | Refills: 0 | Status: SHIPPED | OUTPATIENT
Start: 2019-07-09 | End: 2019-07-09 | Stop reason: DRUGHIGH

## 2019-07-09 RX ORDER — HYDROCODONE BITARTRATE AND ACETAMINOPHEN 7.5; 325 MG/1; MG/1
1 TABLET ORAL EVERY 8 HOURS PRN
Qty: 21 TABLET | Refills: 0 | Status: SHIPPED | OUTPATIENT
Start: 2019-07-09 | End: 2019-07-12 | Stop reason: SDUPTHER

## 2019-07-09 RX ORDER — ATORVASTATIN CALCIUM 40 MG/1
40 TABLET, FILM COATED ORAL NIGHTLY
Qty: 30 TABLET | Refills: 5 | Status: SHIPPED | OUTPATIENT
Start: 2019-07-09 | End: 2019-09-23

## 2019-07-09 NOTE — PATIENT INSTRUCTIONS
Call me Friday to remind me to send in the 30 day supply of hydrocodone. Insurance will only authorize a 7 day supply initially.

## 2019-07-09 NOTE — PROGRESS NOTES
Chief Complaint   Patient presents with   • Establish Care     Subjective   Jaimie Akers is a 44 y.o. female who presents to the office to establish care for chronic pain management.     The following portions of the patient's history were reviewed and updated as appropriate: allergies, current medications, past family history, past medical history, past social history, past surgical history and problem list.    History of Present Illness     Last fasting labs 2/26/19  CBC normal  CMP gluc 129  Lipids tchol 224, trig 383, HDL 28,   TSH, T4 normal  UDS 2/26/19 1/8/18 MRI cspine   FINDINGS:No incidental acute or suspicious paraspinal soft tissue abnormality identified. There is normal signal void within the included cervical and carotid arteries. The craniovertebral junction is unremarkable. No Chiari malformation. No syrinx or myelopathic signal alteration within the cervical and included thoracic cord.     There is mild straightening of normal curvature. The alignment is anatomic. The vertebral body heights are maintained. No compression fracture deformity. No suspicious marrow replacement or osseous lesion. There is endplate degenerative changes at C6-7 redemonstrated.     Loss of T2 disc signal at all levels. There is moderate-severe loss of disc space height at C6-7.     C2-C3: There is mild disc osteophyte complex eccentric to the right. This contributes to mild effacement of the ventral thecal sac. The central spinal canal dimension is within limits of normal without significant stenosis. There is uncovertebral hypertrophy on the right contributing to mild foraminal stenosis. The left foramina is adequate.     C3-C4: There is broad-based disc osteophyte complex without focal soft protrusion. This does contribute to effacement of the thecal sac and mild central spinal canal stenosis. There is uncovertebral hypertrophy present greater on the right. There is right side severe and mild left  foraminal stenosis.     C4-C5: Mild disc osteophyte complex eccentric to the right. This contributes to effacement of the thecal sac and mild central spinal canal stenosis. There is uncovertebral and posterior facet arthritic changes contributing to severe right greater than left foraminal stenosis.     C5-C6: Mild disc osteophyte complex without focal soft protrusion. Effacement of ventral thecal sac and mild central spinal canal stenosis. Uncovertebral and posterior facet  arthritic changes contributes to moderate-severe bilateral foraminal stenosis.     C6-C7: There is moderate disc osteophyte complex with apparent broadbase soft disc material extending beyond the disc osteophyte complex eccentric to the left representing protrusion. This contributes to effacement of ventral thecal sac and mild-moderate central spinal canal stenosis. There is uncovertebral hypertrophy contributing to severe left greater than right foraminal stenosis.     C7-T1:No central spinal canal or foraminal stenosis.     T2-T3: There is disc bulge broad-based with foraminal extension greater on the left where there is mild-moderate stenosis. The central spinal canal is adequate.      IMPRESSION: Redemonstration of multilevel degenerative disc disease and facet arthropathy contributing to multilevel mild central spinal canal stenosis and varying degrees of foraminal stenosis as detailed above. The findings are similar to the examination of 2015. See above report for full details at individual levels.  No syrinx or myelopathic signal alteration of the cervical or included thoracic cord.  Electronically signed by:  Nathan Chadwick MD  1/8/2018 5:50 PM CST        Neck pain chronic with radiculopathy/degenerative disc disease of the neck- chronic, uncontrolled with gabapentin 300mg tid. States her pain is 7/10 and on a good day is about the same.  Relates pain is worse in the morning and at night. Has tried Lortab, PT, Dry Needling in the past,  "and has been to chiropractor many times. Relates muscle relaxers make her feel \"hungover\" hours after she takes them and does not help with pain.    Anxiety/depression-chronic. GEREMIAS García recently restarted Wellbutrin again. Patient states she has tried Xanax years ago but was unable to keep taking them relating to having to find a new PCP. Denies homicidal thoughts, suicidal thoughts, manic episodes. States is depressed due to weight gain and chronic pain.      Overweight-chronic, uncontrolled with diet and phentermine. Patient was restarted on phentermine May 2019.      Chronic Allergic Rhinitis-  Controlled with Flonase and Allegra 180mg. Relates she takes benadryl as needed when symptoms are worse.     Heavy Menstrual Bleeding - Seeing Dr. Gomez for ablation/tubal consultation in [place], appointment today 7-9-19     Hyperlipidemia: needs refill on medication, was supposed to increased to 40mg in February, has been out of it however, since then.  Hyperglycemia: glucose 129 fasting on 6/3/19, will get A1C    Past Medical History:   Diagnosis Date   • Allergic rhinitis    • Anxiety    • Astigmatism    • Blurring of visual image    • Carpal tunnel syndrome    • Depressive disorder    • Endometriosis    • Headache    • Morbid obesity (CMS/HCC)    • Myopia    • Neck pain     djd on MRI   • Obesity    • Ptosis of eyelid           Family History   Problem Relation Age of Onset   • Coronary artery disease Other    • Coronary artery disease Father    • Colon cancer Maternal Grandmother         Review of Systems   Constitutional: Negative for activity change, appetite change, chills, diaphoresis, fatigue, fever and unexpected weight change.   HENT: Negative for congestion, ear discharge, ear pain, nosebleeds, postnasal drip, rhinorrhea, sinus pressure, sinus pain, sneezing, sore throat, tinnitus and trouble swallowing.    Eyes: Negative for photophobia, pain, discharge, redness, itching and visual disturbance. " "  Respiratory: Negative for cough, chest tightness, shortness of breath and wheezing.    Cardiovascular: Negative for chest pain, palpitations and leg swelling.   Gastrointestinal: Negative for abdominal distention, abdominal pain, blood in stool, constipation, diarrhea, nausea and vomiting.   Genitourinary: Negative for difficulty urinating, dyspareunia, dysuria, flank pain, frequency, hematuria, menstrual problem, vaginal bleeding, vaginal discharge and vaginal pain.   Musculoskeletal: Positive for neck pain and neck stiffness. Negative for arthralgias, back pain, gait problem, joint swelling and myalgias.   Skin: Negative for rash and wound.   Allergic/Immunologic: Negative for environmental allergies, food allergies and immunocompromised state.   Neurological: Negative for dizziness, tremors, seizures, syncope, weakness, light-headedness, numbness and headaches.   Hematological: Does not bruise/bleed easily.   Psychiatric/Behavioral: Positive for dysphoric mood. Negative for behavioral problems, self-injury, sleep disturbance and suicidal ideas. The patient is nervous/anxious.        Objective   Vitals:    07/09/19 1052   BP: 122/78   BP Location: Left arm   Patient Position: Sitting   Cuff Size: Adult   Pulse: 68   Resp: 16   Temp: 99.2 °F (37.3 °C)   TempSrc: Tympanic   SpO2: 98%   Weight: 83.8 kg (184 lb 11.2 oz)   Height: 165.1 cm (65\")   PainSc: 0-No pain     Physical Exam   Constitutional: She is oriented to person, place, and time. Vital signs are normal. She appears well-developed and well-nourished. No distress.   HENT:   Head: Normocephalic and atraumatic.   Right Ear: Hearing, tympanic membrane, external ear and ear canal normal.   Left Ear: Hearing, tympanic membrane, external ear and ear canal normal.   Nose: Mucosal edema and rhinorrhea present. Right sinus exhibits no maxillary sinus tenderness and no frontal sinus tenderness. Left sinus exhibits no maxillary sinus tenderness and no frontal sinus " tenderness.   Mouth/Throat: No oropharyngeal exudate, posterior oropharyngeal edema or posterior oropharyngeal erythema.   Post-nasal drip observed   Eyes: EOM are normal. Pupils are equal, round, and reactive to light. Right eye exhibits chemosis. Right eye exhibits no discharge. Left eye exhibits chemosis. Left eye exhibits no discharge. No scleral icterus.   Neck: Neck supple. No JVD present. Spinous process tenderness and muscular tenderness present. Edema and decreased range of motion present. No tracheal deviation present. No thyromegaly present.   Cardiovascular: Normal rate, regular rhythm, normal heart sounds and intact distal pulses. Exam reveals no gallop and no friction rub.   No murmur heard.  Pulmonary/Chest: Effort normal and breath sounds normal. No respiratory distress. She has no wheezes. She has no rales.   Abdominal: Soft. Bowel sounds are normal. She exhibits no distension and no mass. There is no tenderness. There is no rebound and no guarding. No hernia.   Musculoskeletal:        Cervical back: She exhibits tenderness, pain and spasm. She exhibits no bony tenderness, no swelling, no edema, no deformity, no laceration and normal pulse.        Back:    Lymphadenopathy:     She has no cervical adenopathy.   Neurological: She is alert and oriented to person, place, and time. She is not disoriented. She displays a negative Romberg sign. Coordination and gait normal.   Skin: Skin is warm and dry. Capillary refill takes less than 2 seconds. No rash noted. No erythema. No pallor.   Psychiatric: She has a normal mood and affect. Her speech is normal and behavior is normal. Judgment and thought content normal. Her mood appears not anxious. She is not actively hallucinating. Cognition and memory are normal. She does not exhibit a depressed mood.   Patient is dressed appropriately for weather and situation, makes eye contact, and engages in conversation.  She is attentive.   Nursing note and vitals  reviewed.        Assessment/Plan      Anxiety state  -     buPROPion (WELLBUTRIN) 75 MG tablet; Take 1 tablet by mouth Daily.     Depressive disorder  -     buPROPion (WELLBUTRIN) 75 MG tablet; Take 1 tablet by mouth Daily.     Obesity without serious comorbidity, unspecified classification, unspecified obesity type  -     Phentermine HCl 30 MG tablet dispersible; Take 1 tablet by mouth Daily.     Chronic allergic rhinitis  -     FLONASE 50 MCG/ACT nasal spray; 2 sprays into the nostril(s) as directed by provider Daily.  -     Allegra 180mg PO daily.     Neck pain, chronic  -     Ambulatory Referral to Physical Therapy  -     gabapentin (NEURONTIN) 300 MG capsule; Take 1 capsule by mouth 3 (Three) Times a Day.               PHQ-2/PHQ-9 Depression Screening 7/9/2019   Little interest or pleasure in doing things 0   Feeling down, depressed, or hopeless 0   Trouble falling or staying asleep, or sleeping too much -   Feeling tired or having little energy -   Poor appetite or overeating -   Feeling bad about yourself - or that you are a failure or have let yourself or your family down -   Trouble concentrating on things, such as reading the newspaper or watching television -   Moving or speaking so slowly that other people could have noticed. Or the opposite - being so fidgety or restless that you have been moving around a lot more than usual -   Thoughts that you would be better off dead, or of hurting yourself in some way -   Total Score 0   If you checked off any problems, how difficult have these problems made it for you to do your work, take care of things at home, or get along with other people? -   Patient understands the risks associated with this controlled medication, including tolerance and addiction.  Patient also agrees to only obtain this medication from me, and not from a another provider, unless that provider is covering for me in my absence.  Patient also agrees to be compliant in dosing, and not self  adjust the dose of medication.  A signed controlled substance agreement is on file, and the patient has received a controlled substance education sheet at this a previous visit.  The patient has also signed a consent for treatment with a controlled substance as per [facility] policy. CIELO was obtained.    GEREMIAS Amato       This document has been electronically signed by GEREMIAS Amato on July 9, 2019 11:09 AM        Return in about 4 weeks (around 8/6/2019).    Patient Instructions   Call me Friday to remind me to send in the 30 day supply of hydrocodone. Insurance will only authorize a 7 day supply initially.

## 2019-07-12 ENCOUNTER — TELEPHONE (OUTPATIENT)
Dept: FAMILY MEDICINE CLINIC | Facility: CLINIC | Age: 44
End: 2019-07-12

## 2019-07-12 DIAGNOSIS — G89.29 NECK PAIN, CHRONIC: ICD-10-CM

## 2019-07-12 DIAGNOSIS — G89.29 CHRONIC PAIN OF BOTH SHOULDERS: ICD-10-CM

## 2019-07-12 DIAGNOSIS — M54.2 NECK PAIN, CHRONIC: ICD-10-CM

## 2019-07-12 DIAGNOSIS — M25.511 CHRONIC PAIN OF BOTH SHOULDERS: ICD-10-CM

## 2019-07-12 DIAGNOSIS — M25.512 CHRONIC PAIN OF BOTH SHOULDERS: ICD-10-CM

## 2019-07-12 RX ORDER — HYDROCODONE BITARTRATE AND ACETAMINOPHEN 7.5; 325 MG/1; MG/1
1 TABLET ORAL EVERY 8 HOURS PRN
Qty: 90 TABLET | Refills: 0 | Status: SHIPPED | OUTPATIENT
Start: 2019-07-12 | End: 2019-07-18 | Stop reason: ALTCHOICE

## 2019-07-12 NOTE — TELEPHONE ENCOUNTER
----- Message from GEREMIAS Guerrero sent at 7/11/2019 10:58 AM CDT -----  Mammogram and ultrasound good repeat in 1 year.

## 2019-07-16 ENCOUNTER — APPOINTMENT (OUTPATIENT)
Dept: PHYSICAL THERAPY | Facility: HOSPITAL | Age: 44
End: 2019-07-16

## 2019-07-18 ENCOUNTER — OFFICE VISIT (OUTPATIENT)
Dept: FAMILY MEDICINE CLINIC | Facility: CLINIC | Age: 44
End: 2019-07-18

## 2019-07-18 VITALS
OXYGEN SATURATION: 98 % | HEIGHT: 65 IN | SYSTOLIC BLOOD PRESSURE: 124 MMHG | BODY MASS INDEX: 30.66 KG/M2 | DIASTOLIC BLOOD PRESSURE: 62 MMHG | RESPIRATION RATE: 16 BRPM | HEART RATE: 78 BPM | WEIGHT: 184 LBS | TEMPERATURE: 98.6 F

## 2019-07-18 DIAGNOSIS — G89.29 NECK PAIN, CHRONIC: Chronic | ICD-10-CM

## 2019-07-18 DIAGNOSIS — F32.A DEPRESSIVE DISORDER: Primary | Chronic | ICD-10-CM

## 2019-07-18 DIAGNOSIS — F41.1 ANXIETY STATE: Chronic | ICD-10-CM

## 2019-07-18 DIAGNOSIS — M54.2 NECK PAIN, CHRONIC: Chronic | ICD-10-CM

## 2019-07-18 PROCEDURE — 99214 OFFICE O/P EST MOD 30 MIN: CPT | Performed by: NURSE PRACTITIONER

## 2019-07-18 RX ORDER — SERTRALINE HYDROCHLORIDE 25 MG/1
25 TABLET, FILM COATED ORAL DAILY
Qty: 30 TABLET | Refills: 5 | Status: SHIPPED | OUTPATIENT
Start: 2019-07-18 | End: 2019-09-23 | Stop reason: DRUGHIGH

## 2019-07-18 NOTE — PROGRESS NOTES
"Chief Complaint   Patient presents with   • Medication Problem     pt is wanting off the hydrocodone and gabapention in order to get a job      Subjective   Jaimie Akers is a 44 y.o. female who presents to the office for wanting to stop gabapentin and hydrococodone, also discuss anxiety.    The following portions of the patient's history were reviewed and updated as appropriate: allergies, current medications, past family history, past medical history, past social history, past surgical history and problem list.    History of Present Illness     States that she has been offered a new job in housekeeping with [employer] needs to come off of gabapentin and hydrocodone in order to accept this position.  States that she has been taking approximately 2 doses of hydrocodone daily since originally  Prescribed on 7/9/19.  She has been taking gabapentin 300mg three times daily since 2/2019 until she stopped it approximately 3 weeks ago.  She states that these medications have been helpful, but not enough to miss out on a job opportunity. She is currently in physical therapy for her neck pain and is requesting a referral to a closer location.        Neck pain chronic with radiculopathy/degenerative disc disease of the neck- chronic, uncontrolled with gabapentin 300mg tid. States her pain is 7/10 and on a good day is about the same.  Relates pain is worse in the morning and at night. Has tried Lortab, PT, Dry Needling in the past, and has been to chiropractor many times. Relates muscle relaxers make her feel \"hungover\" hours after she takes them and does not help with pain.     1/8/18 MRI cspine   FINDINGS:No incidental acute or suspicious paraspinal soft tissue abnormality identified. There is normal signal void within the included cervical and carotid arteries. The craniovertebral junction is unremarkable. No Chiari malformation. No syrinx or myelopathic signal alteration within the cervical and included " thoracic cord.     There is mild straightening of normal curvature. The alignment is anatomic. The vertebral body heights are maintained. No compression fracture deformity. No suspicious marrow replacement or osseous lesion. There is endplate degenerative changes at C6-7 redemonstrated.     Loss of T2 disc signal at all levels. There is moderate-severe loss of disc space height at C6-7.     C2-C3: There is mild disc osteophyte complex eccentric to the right. This contributes to mild effacement of the ventral thecal sac. The central spinal canal dimension is within limits of normal without significant stenosis. There is uncovertebral hypertrophy on the right contributing to mild foraminal stenosis. The left foramina is adequate.     C3-C4: There is broad-based disc osteophyte complex without focal soft protrusion. This does contribute to effacement of the thecal sac and mild central spinal canal stenosis. There is uncovertebral hypertrophy present greater on the right. There is right side severe and mild left foraminal stenosis.     C4-C5: Mild disc osteophyte complex eccentric to the right. This contributes to effacement of the thecal sac and mild central spinal canal stenosis. There is uncovertebral and posterior facet arthritic changes contributing to severe right greater than left foraminal stenosis.     C5-C6: Mild disc osteophyte complex without focal soft protrusion. Effacement of ventral thecal sac and mild central spinal canal stenosis. Uncovertebral and posterior facet  arthritic changes contributes to moderate-severe bilateral foraminal stenosis.     C6-C7: There is moderate disc osteophyte complex with apparent broadbase soft disc material extending beyond the disc osteophyte complex eccentric to the left representing protrusion. This contributes to effacement of ventral thecal sac and mild-moderate central spinal canal stenosis. There is uncovertebral hypertrophy contributing to severe left greater than  right foraminal stenosis.     C7-T1:No central spinal canal or foraminal stenosis.     T2-T3: There is disc bulge broad-based with foraminal extension greater on the left where there is mild-moderate stenosis. The central spinal canal is adequate.      IMPRESSION: Redemonstration of multilevel degenerative disc disease and facet arthropathy contributing to multilevel mild central spinal canal stenosis and varying degrees of foraminal stenosis as detailed above. The findings are similar to the examination of 2015. See above report for full details at individual levels.  No syrinx or myelopathic signal alteration of the cervical or included thoracic cord.  Electronically signed by:  Nathan Chadwick MD  1/8/2018 5:50 PM CST       Anxiety/depression-chronic. Recently restarted Wellbutrin again but stopped taking due to agitation. Has previously tried Paxil (worked, but quit taking due to feeling better), Effexor, Prozac, Zoloft. Has had nightmares, forgetful, loss of concentration. Has recently been around family members that have.   Would also like to see a therapist. Patient states she has tried Xanax years ago but was unable to keep taking them relating to having to find a new PCP. Denies homicidal thoughts, suicidal thoughts, manic episodes. States is depressed due to weight gain and chronic pain and lifelong family problems.       Past Medical History:   Diagnosis Date   • Allergic rhinitis    • Anxiety    • Astigmatism    • Blurring of visual image    • Carpal tunnel syndrome    • Depressive disorder    • Endometriosis    • Headache    • Morbid obesity (CMS/HCC)    • Myopia    • Neck pain     djd on MRI   • Obesity    • Ptosis of eyelid           Family History   Problem Relation Age of Onset   • Coronary artery disease Other    • Coronary artery disease Father    • Colon cancer Maternal Grandmother         Review of Systems   Constitutional: Negative for activity change, appetite change, chills, diaphoresis,  "fatigue, fever and unexpected weight change.   HENT: Negative for congestion, ear discharge, ear pain, nosebleeds, postnasal drip, rhinorrhea, sinus pressure, sinus pain, sneezing, sore throat, tinnitus and trouble swallowing.    Eyes: Negative for photophobia, pain, discharge, redness and visual disturbance.   Respiratory: Negative for cough, chest tightness, shortness of breath and wheezing.    Cardiovascular: Negative for chest pain, palpitations and leg swelling.   Gastrointestinal: Negative for abdominal distention, abdominal pain, blood in stool, constipation, diarrhea, nausea and vomiting.   Genitourinary: Negative for difficulty urinating, dyspareunia, dysuria, flank pain, frequency, hematuria, menstrual problem, vaginal bleeding, vaginal discharge and vaginal pain.   Musculoskeletal: Positive for neck pain and neck stiffness. Negative for arthralgias, back pain, gait problem, joint swelling and myalgias.   Skin: Negative for rash and wound.   Allergic/Immunologic: Negative for environmental allergies, food allergies and immunocompromised state.   Neurological: Negative for dizziness, tremors, seizures, syncope, weakness, light-headedness, numbness and headaches.   Hematological: Does not bruise/bleed easily.   Psychiatric/Behavioral: Positive for dysphoric mood. Negative for behavioral problems, self-injury, sleep disturbance and suicidal ideas. The patient is nervous/anxious.        Objective   Vitals:    07/18/19 0954   BP: 124/62   BP Location: Left arm   Patient Position: Sitting   Cuff Size: Adult   Pulse: 78   Resp: 16   Temp: 98.6 °F (37 °C)   TempSrc: Tympanic   SpO2: 98%   Weight: 83.5 kg (184 lb)   Height: 165.1 cm (65\")   PainSc:   6   PainLoc: Neck     Physical Exam   Constitutional: She is oriented to person, place, and time. Vital signs are normal. She appears well-developed and well-nourished. No distress.   HENT:   Head: Normocephalic and atraumatic.   Right Ear: Hearing, tympanic membrane, " external ear and ear canal normal.   Left Ear: Hearing, tympanic membrane, external ear and ear canal normal.   Nose: No mucosal edema or rhinorrhea. Right sinus exhibits no maxillary sinus tenderness and no frontal sinus tenderness. Left sinus exhibits no maxillary sinus tenderness and no frontal sinus tenderness.   Mouth/Throat: No oropharyngeal exudate, posterior oropharyngeal edema or posterior oropharyngeal erythema.   Post-nasal drip observed   Eyes: EOM are normal. Pupils are equal, round, and reactive to light.   Neck: Neck supple. No JVD present. Spinous process tenderness and muscular tenderness present. Edema and decreased range of motion present. No tracheal deviation present. No thyromegaly present.   Cardiovascular: Normal rate, regular rhythm, normal heart sounds and intact distal pulses. Exam reveals no gallop and no friction rub.   No murmur heard.  Pulmonary/Chest: Effort normal and breath sounds normal. No respiratory distress. She has no wheezes. She has no rales.   Abdominal: Soft. Bowel sounds are normal. She exhibits no distension and no mass. There is no tenderness. There is no rebound and no guarding. No hernia.   Musculoskeletal:        Cervical back: She exhibits tenderness, pain and spasm. She exhibits no bony tenderness, no swelling, no edema, no deformity, no laceration and normal pulse.        Back:    Lymphadenopathy:     She has no cervical adenopathy.   Neurological: She is alert and oriented to person, place, and time. She is not disoriented. She displays a negative Romberg sign. Coordination and gait normal.   Skin: Skin is warm and dry. Capillary refill takes less than 2 seconds. No rash noted. No erythema. No pallor.   Psychiatric: She has a normal mood and affect. Her speech is normal and behavior is normal. Judgment and thought content normal. Her mood appears not anxious. She is not actively hallucinating. Cognition and memory are normal. She does not exhibit a depressed  mood. She is attentive.   Nursing note and vitals reviewed.      Assessment/Plan   Jaimie was seen today for medication problem.    Diagnoses and all orders for this visit:    Depressive disorder  Comments:   stopped welbutrin, ineffective, started new prescription Zoloft today.   Orders:  -     sertraline (ZOLOFT) 25 MG tablet; Take 1 tablet by mouth Daily.    Neck pain, chronic  Comments:  requested new referral to [facility] instead of [facility] due to drive- this is sent.  Orders:  -     Ambulatory Referral to Physical Therapy    Anxiety state  Comments:   stopped welbutrin, ineffective, started new prescription Zoloft today.   Orders:  -     sertraline (ZOLOFT) 25 MG tablet; Take 1 tablet by mouth Daily.           PHQ-2/PHQ-9 Depression Screening 7/9/2019   Little interest or pleasure in doing things 0   Feeling down, depressed, or hopeless 0   Trouble falling or staying asleep, or sleeping too much -   Feeling tired or having little energy -   Poor appetite or overeating -   Feeling bad about yourself - or that you are a failure or have let yourself or your family down -   Trouble concentrating on things, such as reading the newspaper or watching television -   Moving or speaking so slowly that other people could have noticed. Or the opposite - being so fidgety or restless that you have been moving around a lot more than usual -   Thoughts that you would be better off dead, or of hurting yourself in some way -   Total Score 0   If you checked off any problems, how difficult have these problems made it for you to do your work, take care of things at home, or get along with other people? -       GEREMIAS Amato         Return in about 4 weeks (around 8/15/2019).    Patient Instructions   Work release letter has been generated and printed for patient at this time. val

## 2019-07-19 ENCOUNTER — DOCUMENTATION (OUTPATIENT)
Dept: FAMILY MEDICINE CLINIC | Facility: CLINIC | Age: 44
End: 2019-07-19

## 2019-07-19 ENCOUNTER — TELEPHONE (OUTPATIENT)
Dept: FAMILY MEDICINE CLINIC | Facility: CLINIC | Age: 44
End: 2019-07-19

## 2019-07-19 NOTE — PROGRESS NOTES
Patient saw myself Colleen Meza on 7/2/2019, she was asking for Valium and an increase on her gabapentin for neck pain, I increased her baclofen prescribed gabapentin and phentermine, I handed her a prescription for phentermine 30 mg, 30 count no refills and gabapentin 300 mg, 90 count, 2 refills, paper printed copy that day.    Patient saw Liza Nicohlson on 7/9/2019, she was prescribed gabapentin, phentermine, and added new prescription of Lortabs.    Because patient went to another provider for pain management and controlled substances she has been discharged from my office for all controls.  Patient's recent CIELO shows that on 7/4/2019 she refilled my gabapentin 300 mg, 90 count prescribed by Colleen Meza.  She filled Lortab 7.5 mg, 21 count by Liza Nicholson on 7/9 along with phentermine 30 mg, 30 count on the same date 7/9.    Spoke with Liza about this, Liza is willing to still see the patient but would like her medical assistant Natalia  to call and speak with the patient and see why she changed providers as well as why she asked for a repeat refill on gabapentin and phentermine did not say that I refilled it 7 days earlier.  Also, she would like her to bring in the phentermine printed prescription that my office Colleen because provided with her.  Liza wants Natalia to see what she says and then let Liza know so she can decide care after that.

## 2019-07-19 NOTE — PROGRESS NOTES
Patient brought in phentermine prescription that I prescribed to her today 7/19/2019, Rhea Gilmore on my  voided and will scan a copy of the prescription voided into our chart.

## 2019-07-30 DIAGNOSIS — G89.29 CHRONIC PAIN OF BOTH SHOULDERS: ICD-10-CM

## 2019-07-30 DIAGNOSIS — M50.30 DEGENERATIVE DISC DISEASE, CERVICAL: ICD-10-CM

## 2019-07-30 DIAGNOSIS — M25.511 CHRONIC PAIN OF BOTH SHOULDERS: ICD-10-CM

## 2019-07-30 DIAGNOSIS — M25.512 CHRONIC PAIN OF BOTH SHOULDERS: ICD-10-CM

## 2019-07-30 DIAGNOSIS — M54.2 NECK PAIN, CHRONIC: Primary | ICD-10-CM

## 2019-07-30 DIAGNOSIS — G89.29 NECK PAIN, CHRONIC: Primary | ICD-10-CM

## 2019-09-23 ENCOUNTER — OFFICE VISIT (OUTPATIENT)
Dept: FAMILY MEDICINE CLINIC | Facility: CLINIC | Age: 44
End: 2019-09-23

## 2019-09-23 VITALS
WEIGHT: 176.7 LBS | OXYGEN SATURATION: 98 % | DIASTOLIC BLOOD PRESSURE: 77 MMHG | RESPIRATION RATE: 16 BRPM | HEIGHT: 65 IN | TEMPERATURE: 97.8 F | HEART RATE: 67 BPM | BODY MASS INDEX: 29.44 KG/M2 | SYSTOLIC BLOOD PRESSURE: 124 MMHG

## 2019-09-23 DIAGNOSIS — J30.9 CHRONIC ALLERGIC RHINITIS: ICD-10-CM

## 2019-09-23 DIAGNOSIS — F41.1 GAD (GENERALIZED ANXIETY DISORDER): ICD-10-CM

## 2019-09-23 DIAGNOSIS — K21.9 GASTROESOPHAGEAL REFLUX DISEASE WITHOUT ESOPHAGITIS: Primary | ICD-10-CM

## 2019-09-23 DIAGNOSIS — N92.1 EXCESSIVE AND FREQUENT MENSTRUATION WITH IRREGULAR CYCLE: ICD-10-CM

## 2019-09-23 PROCEDURE — 99214 OFFICE O/P EST MOD 30 MIN: CPT | Performed by: NURSE PRACTITIONER

## 2019-09-23 RX ORDER — OMEPRAZOLE 20 MG/1
20 CAPSULE, DELAYED RELEASE ORAL DAILY
Qty: 30 CAPSULE | Refills: 5 | Status: SHIPPED | OUTPATIENT
Start: 2019-09-23 | End: 2020-03-30 | Stop reason: SDUPTHER

## 2019-09-23 RX ORDER — FEXOFENADINE HCL 180 MG/1
180 TABLET ORAL DAILY
Qty: 30 TABLET | Refills: 5 | Status: SHIPPED | OUTPATIENT
Start: 2019-09-23 | End: 2020-03-30 | Stop reason: SDUPTHER

## 2019-09-23 RX ORDER — HYDROCODONE BITARTRATE AND ACETAMINOPHEN 7.5; 325 MG/1; MG/1
1 TABLET ORAL
COMMUNITY
Start: 2019-08-27 | End: 2020-10-21

## 2019-09-23 RX ORDER — SERTRALINE HYDROCHLORIDE 100 MG/1
100 TABLET, FILM COATED ORAL DAILY
Qty: 90 TABLET | Refills: 1 | Status: SHIPPED | OUTPATIENT
Start: 2019-09-23 | End: 2019-11-14

## 2019-09-23 NOTE — PROGRESS NOTES
Chief Complaint   Patient presents with   • Anxiety     med change, med refills   • Heartburn     Subjective   Jaimie Akers is a 44 y.o. female who presents to the office for follow up of chronic anxiety and heartburn.    The following portions of the patient's history were reviewed and updated as appropriate: allergies, current medications, past family history, past medical history, past social history, past surgical history and problem list.    History of Present Illness   Anxiety/depression-chronic. Recently restarted Wellbutrin again but stopped taking due to agitation. Has previously tried Paxil (worked, but quit taking due to feeling better), Effexor, Prozac, Zoloft. Has had nightmares, forgetful, loss of concentration. Has recently been around family members that have.   Would also like to see a therapist. Will get into therapist on her own.  Patient states she has tried Xanax years ago but was unable to keep taking them relating to having to find a new PCP. started on zoloft at last office visit, helps but not enough, desires increased dose today. Denies homicidal thoughts, suicidal thoughts, manic episodes. States is depressed due to weight gain and chronic pain and lifelong family problems.    GERD: managed with PPI, requests refill today.        Past Medical History:   Diagnosis Date   • Allergic rhinitis    • Anxiety    • Astigmatism    • Blurring of visual image    • Carpal tunnel syndrome    • Depressive disorder    • Endometriosis    • Headache    • Morbid obesity (CMS/HCC)    • Myopia    • Neck pain     djd on MRI   • Obesity    • Ptosis of eyelid           Family History   Problem Relation Age of Onset   • Coronary artery disease Other    • Coronary artery disease Father    • Colon cancer Maternal Grandmother         Review of Systems   Constitutional: Negative for activity change, appetite change, chills, diaphoresis, fatigue, fever and unexpected weight change.   HENT: Negative for  "congestion, ear discharge, ear pain, nosebleeds, postnasal drip, rhinorrhea, sinus pressure, sinus pain, sneezing, sore throat, tinnitus and trouble swallowing.    Eyes: Negative for photophobia, pain, discharge, redness and visual disturbance.   Respiratory: Negative for cough, chest tightness, shortness of breath and wheezing.    Cardiovascular: Negative for chest pain, palpitations and leg swelling.   Gastrointestinal: Negative for abdominal distention, abdominal pain, blood in stool, constipation, diarrhea, nausea and vomiting.   Genitourinary: Negative for difficulty urinating, dyspareunia, dysuria, flank pain, frequency, hematuria, menstrual problem, vaginal bleeding, vaginal discharge and vaginal pain.   Musculoskeletal: Positive for neck pain and neck stiffness. Negative for arthralgias, back pain, gait problem, joint swelling and myalgias.   Skin: Negative for rash and wound.   Allergic/Immunologic: Negative for environmental allergies, food allergies and immunocompromised state.   Neurological: Negative for dizziness, tremors, seizures, syncope, weakness, light-headedness, numbness and headaches.   Hematological: Does not bruise/bleed easily.   Psychiatric/Behavioral: Positive for decreased concentration and dysphoric mood. Negative for agitation, behavioral problems, confusion, hallucinations, self-injury, sleep disturbance and suicidal ideas. The patient is nervous/anxious. The patient is not hyperactive.    All other systems reviewed and are negative.      Objective   Vitals:    09/23/19 0831   BP: 124/77   BP Location: Left arm   Patient Position: Sitting   Cuff Size: Adult   Pulse: 67   Resp: 16   Temp: 97.8 °F (36.6 °C)   TempSrc: Tympanic   SpO2: 98%   Weight: 80.2 kg (176 lb 11.2 oz)   Height: 165.1 cm (65\")   PainSc: 0-No pain     Physical Exam   Constitutional: She is oriented to person, place, and time. Vital signs are normal. She appears well-developed and well-nourished. No distress.   HENT: "   Head: Normocephalic and atraumatic.   Right Ear: Hearing, tympanic membrane, external ear and ear canal normal.   Left Ear: Hearing, tympanic membrane, external ear and ear canal normal.   Nose: No mucosal edema or rhinorrhea. Right sinus exhibits no maxillary sinus tenderness and no frontal sinus tenderness. Left sinus exhibits no maxillary sinus tenderness and no frontal sinus tenderness.   Mouth/Throat: No oropharyngeal exudate, posterior oropharyngeal edema or posterior oropharyngeal erythema.   Post-nasal drip observed   Eyes: EOM are normal. Pupils are equal, round, and reactive to light.   Neck: Neck supple. No JVD present. Spinous process tenderness and muscular tenderness present. Edema and decreased range of motion present. No tracheal deviation present. No thyromegaly present.   Cardiovascular: Normal rate, regular rhythm, normal heart sounds and intact distal pulses. Exam reveals no gallop and no friction rub.   No murmur heard.  Pulmonary/Chest: Effort normal and breath sounds normal. No respiratory distress. She has no wheezes. She has no rales.   Abdominal: Soft. Bowel sounds are normal. She exhibits no distension and no mass. There is no tenderness. There is no rebound and no guarding. No hernia.   Musculoskeletal:        Cervical back: She exhibits tenderness, pain and spasm. She exhibits no bony tenderness, no swelling, no edema, no deformity, no laceration and normal pulse.        Back:    Lymphadenopathy:     She has no cervical adenopathy.   Neurological: She is alert and oriented to person, place, and time. She is not disoriented. She displays a negative Romberg sign. Coordination and gait normal.   Skin: Skin is warm and dry. Capillary refill takes less than 2 seconds. No rash noted. No erythema. No pallor.   Psychiatric: She has a normal mood and affect. Her speech is normal and behavior is normal. Judgment and thought content normal. Her mood appears not anxious. She is not actively  hallucinating. Cognition and memory are normal. She does not exhibit a depressed mood. She is attentive.   Nursing note and vitals reviewed.      Assessment/Plan   Jaimie was seen today for anxiety and heartburn.    Diagnoses and all orders for this visit:    Gastroesophageal reflux disease without esophagitis  -     omeprazole (priLOSEC) 20 MG capsule; Take 1 capsule by mouth Daily.    Chronic allergic rhinitis  -     FLONASE 50 MCG/ACT nasal spray; 2 sprays into the nostril(s) as directed by provider Daily.  -     fexofenadine (ALLEGRA) 180 MG tablet; Take 1 tablet by mouth Daily.    TEE (generalized anxiety disorder)  -     sertraline (ZOLOFT) 100 MG tablet; Take 1 tablet by mouth Daily.           PHQ-2/PHQ-9 Depression Screening 7/9/2019   Little interest or pleasure in doing things 0   Feeling down, depressed, or hopeless 0   Trouble falling or staying asleep, or sleeping too much -   Feeling tired or having little energy -   Poor appetite or overeating -   Feeling bad about yourself - or that you are a failure or have let yourself or your family down -   Trouble concentrating on things, such as reading the newspaper or watching television -   Moving or speaking so slowly that other people could have noticed. Or the opposite - being so fidgety or restless that you have been moving around a lot more than usual -   Thoughts that you would be better off dead, or of hurting yourself in some way -   Total Score 0   If you checked off any problems, how difficult have these problems made it for you to do your work, take care of things at home, or get along with other people? -       GEREMIAS Amato         Return in about 3 months (around 12/23/2019).    There are no Patient Instructions on file for this visit.

## 2019-09-24 RX ORDER — MEDROXYPROGESTERONE ACETATE 10 MG/1
10 TABLET ORAL DAILY
Qty: 30 TABLET | Refills: 5 | OUTPATIENT
Start: 2019-09-24

## 2019-11-14 ENCOUNTER — OFFICE VISIT (OUTPATIENT)
Dept: FAMILY MEDICINE CLINIC | Facility: CLINIC | Age: 44
End: 2019-11-14

## 2019-11-14 VITALS
TEMPERATURE: 98.7 F | HEIGHT: 65 IN | BODY MASS INDEX: 30.35 KG/M2 | SYSTOLIC BLOOD PRESSURE: 124 MMHG | DIASTOLIC BLOOD PRESSURE: 72 MMHG | WEIGHT: 182.2 LBS | OXYGEN SATURATION: 98 % | RESPIRATION RATE: 18 BRPM | HEART RATE: 78 BPM

## 2019-11-14 DIAGNOSIS — F41.1 GENERALIZED ANXIETY DISORDER WITH PANIC ATTACKS: ICD-10-CM

## 2019-11-14 DIAGNOSIS — Z23 NEED FOR INFLUENZA VACCINATION: Primary | ICD-10-CM

## 2019-11-14 DIAGNOSIS — F41.0 GENERALIZED ANXIETY DISORDER WITH PANIC ATTACKS: ICD-10-CM

## 2019-11-14 DIAGNOSIS — F33.1 MODERATE EPISODE OF RECURRENT MAJOR DEPRESSIVE DISORDER (HCC): ICD-10-CM

## 2019-11-14 PROCEDURE — 90674 CCIIV4 VAC NO PRSV 0.5 ML IM: CPT | Performed by: NURSE PRACTITIONER

## 2019-11-14 PROCEDURE — 90471 IMMUNIZATION ADMIN: CPT | Performed by: NURSE PRACTITIONER

## 2019-11-14 PROCEDURE — 99214 OFFICE O/P EST MOD 30 MIN: CPT | Performed by: NURSE PRACTITIONER

## 2019-11-14 RX ORDER — DIAZEPAM 2 MG/1
2 TABLET ORAL 2 TIMES DAILY PRN
Qty: 30 TABLET | Refills: 0 | Status: SHIPPED | OUTPATIENT
Start: 2019-11-14 | End: 2019-12-06 | Stop reason: SDUPTHER

## 2019-11-14 RX ORDER — VENLAFAXINE HYDROCHLORIDE 37.5 MG/1
37.5 CAPSULE, EXTENDED RELEASE ORAL DAILY
Qty: 30 CAPSULE | Refills: 0 | Status: SHIPPED | OUTPATIENT
Start: 2019-11-14 | End: 2019-12-12 | Stop reason: SINTOL

## 2019-11-14 NOTE — PROGRESS NOTES
Chief Complaint   Patient presents with   • Anxiety     wants flu shot     Subjective   Jaimie Ignacia Akers is a 44 y.o. female who presents to the office for worsening of chronic anxiety.    The following portions of the patient's history were reviewed and updated as appropriate: allergies, current medications, past family history, past medical history, past social history, past surgical history and problem list.    History of Present Illness   Chronic anxiety with panic attacks previously managed with Sertraline 100mg daily- but this made her depression worse. Reports worsened with Provera for abnormal uterine bleeding by [facility] provider.  Several years ago used some Xanax, did not like the way it made her feel. Also in the past, many years ago reports had taken a low dose diazepam that helped her significantly. Only used a while and her condition improved and it was stopped.   Would like to try some diazepam again due to daily, frequent panic attacks.  Tried and failed Lexapro and Paxil in the past, never tried effexor, willing to try.  Depression: reports worsened at the same time, denies suicidal or homicidal thoughts. Would like something to help with her depression.   She states the sertraline never did help the depression, but made it worse, so stopped it.   Agreeable to stop sertraline and try Effexor.     Past Medical History:   Diagnosis Date   • Allergic rhinitis    • Anxiety    • Astigmatism    • Blurring of visual image    • Carpal tunnel syndrome    • Depressive disorder    • Endometriosis    • Headache    • Morbid obesity (CMS/HCC)    • Myopia    • Neck pain     djd on MRI   • Obesity    • Ptosis of eyelid           Family History   Problem Relation Age of Onset   • Coronary artery disease Other    • Coronary artery disease Father    • Colon cancer Maternal Grandmother         Review of Systems   Constitutional: Negative.    Respiratory: Negative.    Gastrointestinal: Negative.   "  Psychiatric/Behavioral: Positive for decreased concentration and dysphoric mood. Negative for sleep disturbance and suicidal ideas. The patient is nervous/anxious.    All other systems reviewed and are negative.      Objective   Vitals:    11/14/19 1128   BP: 124/72   BP Location: Left arm   Patient Position: Sitting   Cuff Size: Adult   Pulse: 78   Resp: 18   Temp: 98.7 °F (37.1 °C)   TempSrc: Tympanic   SpO2: 98%   Weight: 82.6 kg (182 lb 3.2 oz)   Height: 165.1 cm (65\")   PainSc:   1     Physical Exam   Constitutional: She is oriented to person, place, and time. She appears well-developed and well-nourished.   HENT:   Head: Normocephalic and atraumatic.   Eyes: Pupils are equal, round, and reactive to light.   Neck: Normal range of motion.   Cardiovascular: Normal rate, normal heart sounds and intact distal pulses. Exam reveals no gallop and no friction rub.   No murmur heard.  Pulmonary/Chest: Effort normal and breath sounds normal. No respiratory distress. She has no wheezes. She has no rales.   Neurological: She is alert and oriented to person, place, and time.   Skin: Skin is warm and dry. Capillary refill takes 2 to 3 seconds. No rash noted. No erythema. No pallor.   Psychiatric: She has a normal mood and affect.   Nursing note and vitals reviewed.      Assessment/Plan   Jaimie was seen today for anxiety.    Diagnoses and all orders for this visit:    Need for influenza vaccination  -     Flucelvax Quad (Vial) =>4 yrs (5701-5070)    Generalized anxiety disorder with panic attacks  -     diazePAM (VALIUM) 2 MG tablet; Take 1 tablet by mouth 2 (Two) Times a Day As Needed for Anxiety.    Moderate episode of recurrent major depressive disorder (CMS/HCC)  -     venlafaxine XR (EFFEXOR XR) 37.5 MG 24 hr capsule; Take 1 capsule by mouth Daily.           PHQ-2/PHQ-9 Depression Screening 7/9/2019   Little interest or pleasure in doing things 0   Feeling down, depressed, or hopeless 0   Trouble falling or staying " asleep, or sleeping too much -   Feeling tired or having little energy -   Poor appetite or overeating -   Feeling bad about yourself - or that you are a failure or have let yourself or your family down -   Trouble concentrating on things, such as reading the newspaper or watching television -   Moving or speaking so slowly that other people could have noticed. Or the opposite - being so fidgety or restless that you have been moving around a lot more than usual -   Thoughts that you would be better off dead, or of hurting yourself in some way -   Total Score 0   If you checked off any problems, how difficult have these problems made it for you to do your work, take care of things at home, or get along with other people? -       GEREMIAS Amato         Return in about 2 weeks (around 11/28/2019).    There are no Patient Instructions on file for this visit.

## 2019-11-18 RX ORDER — ATORVASTATIN CALCIUM 40 MG/1
TABLET, FILM COATED ORAL
Qty: 30 TABLET | Refills: 5 | OUTPATIENT
Start: 2019-11-18

## 2019-12-06 DIAGNOSIS — F41.0 GENERALIZED ANXIETY DISORDER WITH PANIC ATTACKS: ICD-10-CM

## 2019-12-06 DIAGNOSIS — F41.1 GENERALIZED ANXIETY DISORDER WITH PANIC ATTACKS: ICD-10-CM

## 2019-12-06 RX ORDER — DIAZEPAM 2 MG/1
2 TABLET ORAL 2 TIMES DAILY PRN
Qty: 30 TABLET | Refills: 0 | Status: SHIPPED | OUTPATIENT
Start: 2019-12-06 | End: 2020-01-06 | Stop reason: SDUPTHER

## 2019-12-10 RX ORDER — BACLOFEN 10 MG/1
TABLET ORAL
Qty: 90 TABLET | Refills: 0 | OUTPATIENT
Start: 2019-12-10

## 2019-12-12 ENCOUNTER — OFFICE VISIT (OUTPATIENT)
Dept: FAMILY MEDICINE CLINIC | Facility: CLINIC | Age: 44
End: 2019-12-12

## 2019-12-12 VITALS
TEMPERATURE: 98.1 F | RESPIRATION RATE: 16 BRPM | DIASTOLIC BLOOD PRESSURE: 76 MMHG | WEIGHT: 184.1 LBS | SYSTOLIC BLOOD PRESSURE: 122 MMHG | HEART RATE: 88 BPM | HEIGHT: 65 IN | BODY MASS INDEX: 30.67 KG/M2 | OXYGEN SATURATION: 99 %

## 2019-12-12 DIAGNOSIS — F32.A DEPRESSIVE DISORDER: ICD-10-CM

## 2019-12-12 DIAGNOSIS — F41.1 ANXIETY STATE: ICD-10-CM

## 2019-12-12 DIAGNOSIS — F41.1 GENERALIZED ANXIETY DISORDER WITH PANIC ATTACKS: Primary | ICD-10-CM

## 2019-12-12 DIAGNOSIS — F41.0 GENERALIZED ANXIETY DISORDER WITH PANIC ATTACKS: Primary | ICD-10-CM

## 2019-12-12 DIAGNOSIS — Z13.1 SCREENING FOR DIABETES MELLITUS: ICD-10-CM

## 2019-12-12 PROCEDURE — 99213 OFFICE O/P EST LOW 20 MIN: CPT | Performed by: NURSE PRACTITIONER

## 2019-12-12 PROCEDURE — 83527 ASSAY OF INSULIN: CPT | Performed by: NURSE PRACTITIONER

## 2019-12-12 PROCEDURE — 83525 ASSAY OF INSULIN: CPT | Performed by: NURSE PRACTITIONER

## 2019-12-12 RX ORDER — QUETIAPINE FUMARATE 25 MG/1
50 TABLET, FILM COATED ORAL NIGHTLY
Qty: 12 TABLET | Refills: 0 | Status: SHIPPED | OUTPATIENT
Start: 2019-12-12 | End: 2019-12-12

## 2019-12-12 RX ORDER — QUETIAPINE FUMARATE 50 MG/1
50 TABLET, FILM COATED ORAL NIGHTLY
Qty: 12 TABLET | Refills: 0 | Status: SHIPPED | OUTPATIENT
Start: 2019-12-12 | End: 2020-01-06 | Stop reason: SINTOL

## 2019-12-12 NOTE — PATIENT INSTRUCTIONS
Take 50mg by mouth nightly for 3 nights.   Then, take one tablet in the morning and one in the evening until your next appointment

## 2019-12-12 NOTE — PROGRESS NOTES
"Chief Complaint   Patient presents with   • Depression     side effects     Subjective   Jaimie Akers is a 44 y.o. female who presents to the office to discuss worsening side effects from antidepressant and self discontinuatoin of same.    The following portions of the patient's history were reviewed and updated as appropriate: allergies, current medications, past family history, past medical history, past social history, past surgical history and problem list.    History of Present Illness   Fasting Labs: Due today   This is a patient of pain management.     Chronic anxiety with panic attacks: This is an ongoing problem that was previously managed with sertraline 100mg daily but was making her depression worse. She also reports worsening symptoms with Lexapro and Paxil in the past. Several years ago she did use PRN xanax but did not like the way that it made her feel. However, low dose diazepam helped her significantly until it was stopped after her condition improved. She now takes the diazepam 2mg PRN and feels that it provides her with some relief when needed but worsens her depression. She reports that she has not picked up her most recent valium yet but will use it when needed. She is slightly concerned that it could make her depression worse.     Depression: She began taking Effexor last visit 11/14/19 and stopped sertraline because it reportedly \"never helped with the depression and made it worse\". She continues to deny SI/HI. Since taking the Effexor and is now experiencing chest pain, nausea, only getting a few hours of sleep a night with very vivid dreams. She is also experiencing muscle stiffness and fatigue, dry mouth, and nervousness. She would like to find something that works for her and is willing to try other medications.   She reportedly has tried Buspar in the past and Prozac but cannot remember anything other than it didn't work well.   She took gabapentin in the past and felt like " "it did not help her with any neve pain in her neck and arm, but helped with improving her anxiety when taken at night.   Denies suicidal or homidical thoughts.     Past Medical History:   Diagnosis Date   • Allergic rhinitis    • Anxiety    • Astigmatism    • Blurring of visual image    • Carpal tunnel syndrome    • Depressive disorder    • Endometriosis    • Headache    • Morbid obesity (CMS/HCC)    • Myopia    • Neck pain     djd on MRI   • Obesity    • Ptosis of eyelid           Family History   Problem Relation Age of Onset   • Coronary artery disease Other    • Coronary artery disease Father    • Colon cancer Maternal Grandmother         Review of Systems   Constitutional: Positive for activity change and fatigue.   HENT: Negative.    Respiratory: Negative.    Cardiovascular: Positive for chest pain.   Gastrointestinal: Negative.    Genitourinary: Negative.    Neurological: Positive for weakness.   Psychiatric/Behavioral: Positive for decreased concentration and dysphoric mood. Negative for sleep disturbance and suicidal ideas. The patient is nervous/anxious.    All other systems reviewed and are negative.      Objective   Vitals:    12/12/19 0912   BP: 122/76   BP Location: Left arm   Patient Position: Sitting   Cuff Size: Adult   Pulse: 88   Resp: 16   Temp: 98.1 °F (36.7 °C)   TempSrc: Tympanic   SpO2: 99%   Weight: 83.5 kg (184 lb 1.6 oz)   Height: 165.1 cm (65\")   PainSc: 0-No pain     Physical Exam   Constitutional: She is oriented to person, place, and time. She appears well-developed and well-nourished.   HENT:   Head: Normocephalic and atraumatic.   Eyes: Pupils are equal, round, and reactive to light.   Neck: Normal range of motion.   Cardiovascular: Normal rate, normal heart sounds and intact distal pulses. Exam reveals no gallop and no friction rub.   No murmur heard.  Pulmonary/Chest: Effort normal and breath sounds normal. No respiratory distress. She has no wheezes. She has no rales. "   Neurological: She is alert and oriented to person, place, and time.   Skin: Skin is warm and dry. Capillary refill takes 2 to 3 seconds. No rash noted. No erythema. No pallor.   Psychiatric: She has a normal mood and affect.   Nursing note and vitals reviewed.      Assessment/Plan   Jaimie was seen today for depression.    Diagnoses and all orders for this visit:    Generalized anxiety disorder with panic attacks  -     Discontinue: QUEtiapine (SEROQUEL) 25 MG tablet; Take 2 tablets by mouth Every Night. Take one daily for 3 days then increase to twice daily  -     QUEtiapine (SEROQUEL) 50 MG tablet; Take 1 tablet by mouth Every Night. Take 1 tablet by mouth every night for 3 days, then increase to two times a day totaling 100mg    Screening for diabetes mellitus  -     Insulin, Free & Total, Serum    Depressive disorder  -     Discontinue: QUEtiapine (SEROQUEL) 25 MG tablet; Take 2 tablets by mouth Every Night. Take one daily for 3 days then increase to twice daily  -     QUEtiapine (SEROQUEL) 50 MG tablet; Take 1 tablet by mouth Every Night. Take 1 tablet by mouth every night for 3 days, then increase to two times a day totaling 100mg    Anxiety state  -     Discontinue: QUEtiapine (SEROQUEL) 25 MG tablet; Take 2 tablets by mouth Every Night. Take one daily for 3 days then increase to twice daily  -     QUEtiapine (SEROQUEL) 50 MG tablet; Take 1 tablet by mouth Every Night. Take 1 tablet by mouth every night for 3 days, then increase to two times a day totaling 100mg           PHQ-2/PHQ-9 Depression Screening 7/9/2019   Little interest or pleasure in doing things 0   Feeling down, depressed, or hopeless 0   Trouble falling or staying asleep, or sleeping too much -   Feeling tired or having little energy -   Poor appetite or overeating -   Feeling bad about yourself - or that you are a failure or have let yourself or your family down -   Trouble concentrating on things, such as reading the newspaper or watching  television -   Moving or speaking so slowly that other people could have noticed. Or the opposite - being so fidgety or restless that you have been moving around a lot more than usual -   Thoughts that you would be better off dead, or of hurting yourself in some way -   Total Score 0   If you checked off any problems, how difficult have these problems made it for you to do your work, take care of things at home, or get along with other people? -       Liza Nicholson, GEREMIAS         Return in about 1 week (around 12/19/2019).    Patient Instructions   Take 50mg by mouth nightly for 3 nights.   Then, take one tablet in the morning and one in the evening until your next appointment

## 2019-12-18 LAB
INSULIN FREE SERPL-ACNC: 110 UU/ML
INSULIN SERPL-ACNC: 110 UU/ML

## 2019-12-19 DIAGNOSIS — E16.1 HYPERINSULINEMIA: Primary | ICD-10-CM

## 2019-12-23 PROCEDURE — 36415 COLL VENOUS BLD VENIPUNCTURE: CPT | Performed by: NURSE PRACTITIONER

## 2019-12-23 PROCEDURE — 83036 HEMOGLOBIN GLYCOSYLATED A1C: CPT | Performed by: NURSE PRACTITIONER

## 2019-12-24 LAB — HBA1C MFR BLD: 5.74 % (ref 4.8–5.6)

## 2020-01-06 ENCOUNTER — OFFICE VISIT (OUTPATIENT)
Dept: FAMILY MEDICINE CLINIC | Facility: CLINIC | Age: 45
End: 2020-01-06

## 2020-01-06 VITALS
HEIGHT: 65 IN | DIASTOLIC BLOOD PRESSURE: 90 MMHG | BODY MASS INDEX: 30.21 KG/M2 | RESPIRATION RATE: 16 BRPM | OXYGEN SATURATION: 97 % | SYSTOLIC BLOOD PRESSURE: 140 MMHG | HEART RATE: 80 BPM | TEMPERATURE: 97.9 F | WEIGHT: 181.3 LBS

## 2020-01-06 DIAGNOSIS — F41.0 GENERALIZED ANXIETY DISORDER WITH PANIC ATTACKS: ICD-10-CM

## 2020-01-06 DIAGNOSIS — E78.2 MIXED HYPERLIPIDEMIA: Primary | ICD-10-CM

## 2020-01-06 DIAGNOSIS — E78.1 HYPERTRIGLYCERIDEMIA: ICD-10-CM

## 2020-01-06 DIAGNOSIS — D50.9 IRON DEFICIENCY ANEMIA, UNSPECIFIED IRON DEFICIENCY ANEMIA TYPE: ICD-10-CM

## 2020-01-06 DIAGNOSIS — E66.9 OBESITY WITHOUT SERIOUS COMORBIDITY, UNSPECIFIED CLASSIFICATION, UNSPECIFIED OBESITY TYPE: ICD-10-CM

## 2020-01-06 DIAGNOSIS — I10 ESSENTIAL HYPERTENSION: Primary | ICD-10-CM

## 2020-01-06 DIAGNOSIS — F32.A DEPRESSIVE DISORDER: ICD-10-CM

## 2020-01-06 DIAGNOSIS — F41.1 ANXIETY STATE: ICD-10-CM

## 2020-01-06 DIAGNOSIS — K21.9 GASTROESOPHAGEAL REFLUX DISEASE WITHOUT ESOPHAGITIS: ICD-10-CM

## 2020-01-06 DIAGNOSIS — F41.1 GENERALIZED ANXIETY DISORDER WITH PANIC ATTACKS: ICD-10-CM

## 2020-01-06 LAB
ALBUMIN SERPL-MCNC: 4.6 G/DL (ref 3.5–5)
ALBUMIN/GLOB SERPL: 1.4 G/DL (ref 1.1–1.8)
ALP SERPL-CCNC: 73 U/L (ref 38–126)
ALT SERPL W P-5'-P-CCNC: 19 U/L
ANION GAP SERPL CALCULATED.3IONS-SCNC: 8 MMOL/L (ref 5–15)
AST SERPL-CCNC: 20 U/L (ref 14–36)
BASOPHILS # BLD AUTO: 0.05 10*3/MM3 (ref 0–0.2)
BASOPHILS NFR BLD AUTO: 0.7 % (ref 0–1.5)
BILIRUB SERPL-MCNC: 0.4 MG/DL (ref 0.2–1.3)
BUN BLD-MCNC: 14 MG/DL (ref 7–23)
BUN/CREAT SERPL: 14 (ref 7–25)
CALCIUM SPEC-SCNC: 9.6 MG/DL (ref 8.4–10.2)
CHLORIDE SERPL-SCNC: 104 MMOL/L (ref 101–112)
CHOLEST SERPL-MCNC: 240 MG/DL (ref 150–200)
CO2 SERPL-SCNC: 27 MMOL/L (ref 22–30)
CREAT BLD-MCNC: 1 MG/DL (ref 0.52–1.04)
DEPRECATED RDW RBC AUTO: 40.5 FL (ref 37–54)
EOSINOPHIL # BLD AUTO: 0.05 10*3/MM3 (ref 0–0.4)
EOSINOPHIL NFR BLD AUTO: 0.7 % (ref 0.3–6.2)
ERYTHROCYTE [DISTWIDTH] IN BLOOD BY AUTOMATED COUNT: 13.2 % (ref 12.3–15.4)
GFR SERPL CREATININE-BSD FRML MDRD: 60 ML/MIN/1.73 (ref 58–135)
GLOBULIN UR ELPH-MCNC: 3.3 GM/DL (ref 2.3–3.5)
GLUCOSE BLD-MCNC: 116 MG/DL (ref 70–99)
HCT VFR BLD AUTO: 42.4 % (ref 34–46.6)
HDLC SERPL-MCNC: 27 MG/DL (ref 40–59)
HGB BLD-MCNC: 14.6 G/DL (ref 12–15.9)
LDLC SERPL CALC-MCNC: 162 MG/DL
LDLC/HDLC SERPL: 5.99 {RATIO} (ref 0–3.22)
LYMPHOCYTES # BLD AUTO: 2.08 10*3/MM3 (ref 0.7–3.1)
LYMPHOCYTES NFR BLD AUTO: 31.2 % (ref 19.6–45.3)
MCH RBC QN AUTO: 29.1 PG (ref 26.6–33)
MCHC RBC AUTO-ENTMCNC: 34.4 G/DL (ref 31.5–35.7)
MCV RBC AUTO: 84.6 FL (ref 79–97)
MONOCYTES # BLD AUTO: 0.48 10*3/MM3 (ref 0.1–0.9)
MONOCYTES NFR BLD AUTO: 7.2 % (ref 5–12)
NEUTROPHILS # BLD AUTO: 4.01 10*3/MM3 (ref 1.7–7)
NEUTROPHILS NFR BLD AUTO: 60.2 % (ref 42.7–76)
PLATELET # BLD AUTO: 301 10*3/MM3 (ref 140–450)
PMV BLD AUTO: 9.4 FL (ref 6–12)
POTASSIUM BLD-SCNC: 4.3 MMOL/L (ref 3.4–5)
PROT SERPL-MCNC: 7.9 G/DL (ref 6.3–8.6)
RBC # BLD AUTO: 5.01 10*6/MM3 (ref 3.77–5.28)
SODIUM BLD-SCNC: 139 MMOL/L (ref 137–145)
TRIGL SERPL-MCNC: 256 MG/DL
VLDLC SERPL-MCNC: 51.2 MG/DL
WBC NRBC COR # BLD: 6.67 10*3/MM3 (ref 3.4–10.8)

## 2020-01-06 PROCEDURE — 84439 ASSAY OF FREE THYROXINE: CPT | Performed by: NURSE PRACTITIONER

## 2020-01-06 PROCEDURE — 99214 OFFICE O/P EST MOD 30 MIN: CPT | Performed by: NURSE PRACTITIONER

## 2020-01-06 PROCEDURE — 83036 HEMOGLOBIN GLYCOSYLATED A1C: CPT | Performed by: NURSE PRACTITIONER

## 2020-01-06 PROCEDURE — 84480 ASSAY TRIIODOTHYRONINE (T3): CPT | Performed by: NURSE PRACTITIONER

## 2020-01-06 PROCEDURE — 84443 ASSAY THYROID STIM HORMONE: CPT | Performed by: NURSE PRACTITIONER

## 2020-01-06 PROCEDURE — 36415 COLL VENOUS BLD VENIPUNCTURE: CPT | Performed by: NURSE PRACTITIONER

## 2020-01-06 PROCEDURE — 80061 LIPID PANEL: CPT | Performed by: NURSE PRACTITIONER

## 2020-01-06 PROCEDURE — 80053 COMPREHEN METABOLIC PANEL: CPT | Performed by: NURSE PRACTITIONER

## 2020-01-06 PROCEDURE — 85025 COMPLETE CBC W/AUTO DIFF WBC: CPT | Performed by: NURSE PRACTITIONER

## 2020-01-06 RX ORDER — LISINOPRIL 10 MG/1
10 TABLET ORAL DAILY
Qty: 30 TABLET | Refills: 5 | Status: SHIPPED | OUTPATIENT
Start: 2020-01-06 | End: 2020-01-17 | Stop reason: SINTOL

## 2020-01-06 RX ORDER — DIAZEPAM 2 MG/1
2 TABLET ORAL 2 TIMES DAILY PRN
Qty: 45 TABLET | Refills: 0 | Status: SHIPPED | OUTPATIENT
Start: 2020-01-06 | End: 2020-01-17 | Stop reason: ALTCHOICE

## 2020-01-06 RX ORDER — ATORVASTATIN CALCIUM 40 MG/1
40 TABLET, FILM COATED ORAL NIGHTLY
Qty: 90 TABLET | Refills: 0 | Status: SHIPPED | OUTPATIENT
Start: 2020-01-06 | End: 2020-04-09

## 2020-01-06 RX ORDER — ARIPIPRAZOLE 10 MG/1
10 TABLET ORAL DAILY
Qty: 30 TABLET | Refills: 0 | Status: SHIPPED | OUTPATIENT
Start: 2020-01-06 | End: 2020-01-17 | Stop reason: SINTOL

## 2020-01-06 NOTE — PROGRESS NOTES
"Chief Complaint   Patient presents with   • Hypertension     Subjective   Jaimie Akers is a 44 y.o. female who presents to the office for evaluation of recent hypertensive episodes. She is a known patient of Pain Management.    The following portions of the patient's history were reviewed and updated as appropriate: allergies, current medications, past family history, past medical history, past social history, past surgical history and problem list.    History of Present Illness   Fasting Labs 02/26/19-- due today; were not collected with a1c  12/23/19 labs  A1c: 5.79%-- Rx sent for metformin daily after labs obtained    High Blood Pressure: She reports that over the past few weeks she has been experiencing high BP over the past week and is taking it at home. Her readings at home ranged from 167/95 at rest to 189/111 while feeling anxious. She reports that she can feel flushing in her face when it is high and that her \"heart is racing\".  She does reports that it was high back in August at the pain management clinic but had not been a problem since. Her BP today is 140/90 and she is not currently on anything for her BP.      Anxiety/Depression: This is an ongoing problem for which changes were made at her previous visit. At that time, she was started on Seroquel, but stopped taking this after the second dose due to an extreme change in anxiety and depression that came in waves and was unbearable. She is interested in taking a different medication along with her PRN valium. She is also due for a refill on valium today.   She does feel like her symptoms are more anxiety related and feels like her depression is better under control.     Past Medical History:   Diagnosis Date   • Allergic rhinitis    • Anxiety    • Astigmatism    • Blurring of visual image    • Carpal tunnel syndrome    • Depressive disorder    • Endometriosis    • Headache    • Morbid obesity (CMS/[facility])    • Myopia    • Neck pain     djd on " "MRI   • Obesity    • Ptosis of eyelid           Family History   Problem Relation Age of Onset   • Coronary artery disease Other    • Coronary artery disease Father    • Colon cancer Maternal Grandmother         Review of Systems   Constitutional: Positive for fatigue. Negative for activity change.   HENT: Negative.    Eyes: Negative.    Respiratory: Negative.    Cardiovascular: Negative for chest pain.   Gastrointestinal: Negative.    Endocrine: Negative.    Genitourinary: Negative.    Musculoskeletal: Positive for arthralgias, back pain and neck pain.   Neurological: Negative for weakness.   Psychiatric/Behavioral: Positive for decreased concentration and dysphoric mood. Negative for sleep disturbance and suicidal ideas. The patient is nervous/anxious.    All other systems reviewed and are negative.      Objective   Vitals:    01/06/20 1112   BP: 140/90   BP Location: Left arm   Patient Position: Sitting   Cuff Size: Adult   Pulse: 80   Resp: 16   Temp: 97.9 °F (36.6 °C)   TempSrc: Tympanic   SpO2: 97%   Weight: 82.2 kg (181 lb 4.8 oz)   Height: 165.1 cm (65\")     Physical Exam   Constitutional: She is oriented to person, place, and time. She appears well-developed and well-nourished.   HENT:   Head: Normocephalic and atraumatic.   Eyes: Pupils are equal, round, and reactive to light.   Neck: Normal range of motion.   Cardiovascular: Normal rate, normal heart sounds and intact distal pulses. Exam reveals no gallop and no friction rub.   No murmur heard.  Pulmonary/Chest: Effort normal and breath sounds normal. No respiratory distress. She has no wheezes. She has no rales.   Neurological: She is alert and oriented to person, place, and time.   Skin: Skin is warm and dry. Capillary refill takes 2 to 3 seconds. No rash noted. No erythema. No pallor.   Psychiatric: She has a normal mood and affect.   Nursing note and vitals reviewed.      Assessment/Plan   Jaimie was seen today for hypertension.    Diagnoses and all " orders for this visit:    Essential hypertension  -     lisinopril (PRINIVIL,ZESTRIL) 10 MG tablet; Take 1 tablet by mouth Daily.    Hypertriglyceridemia  -     CBC & Differential  -     Comprehensive Metabolic Panel  -     Lipid Panel    Obesity without serious comorbidity, unspecified classification, unspecified obesity type  -     CBC & Differential  -     Comprehensive Metabolic Panel  -     Lipid Panel    Iron deficiency anemia, unspecified iron deficiency anemia type  -     CBC & Differential  -     Comprehensive Metabolic Panel    Gastroesophageal reflux disease without esophagitis  -     CBC & Differential  -     Comprehensive Metabolic Panel    Anxiety state  -     CBC & Differential  -     Comprehensive Metabolic Panel  -     T4, Free  -     TSH  -     T3  -     ARIPiprazole (ABILIFY) 10 MG tablet; Take 1 tablet by mouth Daily.    Depressive disorder  -     CBC & Differential  -     Comprehensive Metabolic Panel  -     T4, Free  -     TSH  -     T3    Generalized anxiety disorder with panic attacks  -     diazePAM (VALIUM) 2 MG tablet; Take 1 tablet by mouth 2 (Two) Times a Day As Needed for Anxiety.           PHQ-2/PHQ-9 Depression Screening 7/9/2019   Little interest or pleasure in doing things 0   Feeling down, depressed, or hopeless 0   Trouble falling or staying asleep, or sleeping too much -   Feeling tired or having little energy -   Poor appetite or overeating -   Feeling bad about yourself - or that you are a failure or have let yourself or your family down -   Trouble concentrating on things, such as reading the newspaper or watching television -   Moving or speaking so slowly that other people could have noticed. Or the opposite - being so fidgety or restless that you have been moving around a lot more than usual -   Thoughts that you would be better off dead, or of hurting yourself in some way -   Total Score 0   If you checked off any problems, how difficult have these problems made it for you  to do your work, take care of things at home, or get along with other people? -   Patient understands the risks associated with this controlled medication, including tolerance and addiction.  Patient also agrees to only obtain this medication from me, and not from a another provider, unless that provider is covering for me in my absence.  Patient also agrees to be compliant in dosing, and not self adjust the dose of medication.  A signed controlled substance agreement is on file, and the patient has received a controlled substance education sheet at this a previous visit.  The patient has also signed a consent for treatment with a controlled substance as per [facility] policy. CIELO was obtained.      GEREMIAS Amato         Return in about 2 weeks (around 1/20/2020).    There are no Patient Instructions on file for this visit.

## 2020-01-07 LAB
HBA1C MFR BLD: 5.65 % (ref 4.8–5.6)
T3 SERPL-MCNC: 118 NG/DL (ref 80–200)
T4 FREE SERPL-MCNC: 1.42 NG/DL (ref 0.93–1.7)
TSH SERPL DL<=0.05 MIU/L-ACNC: 1.02 UIU/ML (ref 0.27–4.2)

## 2020-01-17 ENCOUNTER — OFFICE VISIT (OUTPATIENT)
Dept: FAMILY MEDICINE CLINIC | Facility: CLINIC | Age: 45
End: 2020-01-17

## 2020-01-17 VITALS
OXYGEN SATURATION: 99 % | DIASTOLIC BLOOD PRESSURE: 98 MMHG | RESPIRATION RATE: 16 BRPM | TEMPERATURE: 99.6 F | SYSTOLIC BLOOD PRESSURE: 158 MMHG | HEIGHT: 65 IN | HEART RATE: 98 BPM | BODY MASS INDEX: 29.84 KG/M2 | WEIGHT: 179.1 LBS

## 2020-01-17 DIAGNOSIS — I10 ESSENTIAL HYPERTENSION: ICD-10-CM

## 2020-01-17 DIAGNOSIS — J06.9 ACUTE URI OF MULTIPLE SITES: Primary | ICD-10-CM

## 2020-01-17 PROCEDURE — 99213 OFFICE O/P EST LOW 20 MIN: CPT | Performed by: NURSE PRACTITIONER

## 2020-01-17 RX ORDER — SULFAMETHOXAZOLE AND TRIMETHOPRIM 800; 160 MG/1; MG/1
1 TABLET ORAL 2 TIMES DAILY
Qty: 14 TABLET | Refills: 0 | Status: SHIPPED | OUTPATIENT
Start: 2020-01-17 | End: 2020-01-24

## 2020-01-17 NOTE — PATIENT INSTRUCTIONS
Begin metoprolol 25mg twice daily for high blood pressure, see me in 1 week. If BP >180 on top or 100 on bottom two readings in a row, come back sooner or seek emergency treatment if symptomatic.

## 2020-01-17 NOTE — PROGRESS NOTES
Chief Complaint   Patient presents with   • Medication Problem     lisinipril not lowering bp   • Sinusitis     possible     Subjective   Jaimie Ignacia Akers is a 44 y.o. female who presents to the office for hypertension. Also current sinus infection    The following portions of the patient's history were reviewed and updated as appropriate: allergies, current medications, past family history, past medical history, past social history, past surgical history and problem list.    History of Present Illness   HTN: reports new development of pins and needles sensation of feet and felt feet were swelling with lisinopril, so she stopped it 4 days ago. Today BP is highest it has been in this office at 158/98. HR 98 today  Reports feet feel normal now.  Reports on home monitor her pressures had been 180/100 before she stopped the lisinopril.  After stopping lisinopril it was 170/94 on home monitor. Highest recorded pressure was 198/109. She was in the urgent care when that reading was obtained.  She was there for new employment testing.    Sinus infection: left maxillary pain. Shooting, not constant. No sinus pressure or tenderness to palpation. Some nasal congestion and sore throat, no other symptoms.      Past Medical History:   Diagnosis Date   • Allergic rhinitis    • Anxiety    • Astigmatism    • Blurring of visual image    • Carpal tunnel syndrome    • Depressive disorder    • Endometriosis    • Headache    • Morbid obesity (CMS/HCC)    • Myopia    • Neck pain     djd on MRI   • Obesity    • Ptosis of eyelid           Family History   Problem Relation Age of Onset   • Coronary artery disease Other    • Coronary artery disease Father    • Colon cancer Maternal Grandmother         Review of Systems   Constitutional: Positive for fatigue. Negative for activity change.   HENT: Positive for congestion, postnasal drip, sinus pain and sore throat.    Eyes: Negative.    Respiratory: Negative.    Cardiovascular: Negative  "for chest pain.   Gastrointestinal: Negative.    Endocrine: Negative.    Genitourinary: Negative.    Musculoskeletal: Positive for arthralgias, back pain and neck pain.   Neurological: Negative for weakness.   Psychiatric/Behavioral: Positive for decreased concentration and dysphoric mood. Negative for sleep disturbance and suicidal ideas. The patient is nervous/anxious.    All other systems reviewed and are negative.      Objective   Vitals:    01/17/20 1054   BP: 158/98   BP Location: Left arm   Patient Position: Sitting   Cuff Size: Adult   Pulse: 98   Resp: 16   Temp: 99.6 °F (37.6 °C)   TempSrc: Tympanic   SpO2: 99%   Weight: 81.2 kg (179 lb 1.6 oz)   Height: 165.1 cm (65\")   PainSc:   7   PainLoc: Neck     Physical Exam   Constitutional: She is oriented to person, place, and time. She appears well-developed and well-nourished.   HENT:   Head: Normocephalic and atraumatic.   Right Ear: Tympanic membrane is not perforated and not bulging. A middle ear effusion is present.   Left Ear: Tympanic membrane is bulging. Tympanic membrane is not perforated. A middle ear effusion is present.   Nose: Mucosal edema and rhinorrhea present. Right sinus exhibits no maxillary sinus tenderness and no frontal sinus tenderness. Left sinus exhibits no maxillary sinus tenderness and no frontal sinus tenderness.   Mouth/Throat: Uvula is midline and mucous membranes are normal. Oropharyngeal exudate present.   Eyes: Pupils are equal, round, and reactive to light.   Neck: Normal range of motion.   Cardiovascular: Normal rate, normal heart sounds and intact distal pulses. Exam reveals no gallop and no friction rub.   No murmur heard.  Pulmonary/Chest: Effort normal and breath sounds normal. No respiratory distress. She has no wheezes. She has no rales.   Neurological: She is alert and oriented to person, place, and time.   Skin: Skin is warm and dry. Capillary refill takes 2 to 3 seconds. No rash noted. No erythema. No pallor. "   Psychiatric: She has a normal mood and affect.   Nursing note and vitals reviewed.      Assessment/Plan   Jaimie was seen today for medication problem and sinusitis.    Diagnoses and all orders for this visit:    Acute URI of multiple sites  -     sulfamethoxazole-trimethoprim (BACTRIM DS,SEPTRA DS) 800-160 MG per tablet; Take 1 tablet by mouth 2 (Two) Times a Day for 7 days.    Essential hypertension  -     metoprolol tartrate (LOPRESSOR) 25 MG tablet; Take 1 tablet by mouth 2 (Two) Times a Day.           PHQ-2/PHQ-9 Depression Screening 1/17/2020   Little interest or pleasure in doing things 1   Feeling down, depressed, or hopeless 0   Trouble falling or staying asleep, or sleeping too much 1   Feeling tired or having little energy 1   Poor appetite or overeating 0   Feeling bad about yourself - or that you are a failure or have let yourself or your family down 0   Trouble concentrating on things, such as reading the newspaper or watching television 0   Moving or speaking so slowly that other people could have noticed. Or the opposite - being so fidgety or restless that you have been moving around a lot more than usual 0   Thoughts that you would be better off dead, or of hurting yourself in some way 0   Total Score 3   If you checked off any problems, how difficult have these problems made it for you to do your work, take care of things at home, or get along with other people? -       Liza Nicholson, APRN         Return in about 1 week (around 1/24/2020).    Patient Instructions   Begin metoprolol 25mg twice daily for high blood pressure, see me in 1 week. If BP >180 on top or 100 on bottom two readings in a row, come back sooner or seek emergency treatment if symptomatic.

## 2020-02-07 DIAGNOSIS — F41.1 ANXIETY STATE: ICD-10-CM

## 2020-02-10 RX ORDER — ARIPIPRAZOLE 10 MG/1
10 TABLET ORAL DAILY
Qty: 30 TABLET | Refills: 5 | OUTPATIENT
Start: 2020-02-10

## 2020-02-18 DIAGNOSIS — I10 ESSENTIAL HYPERTENSION: ICD-10-CM

## 2020-03-30 DIAGNOSIS — K21.9 GASTROESOPHAGEAL REFLUX DISEASE WITHOUT ESOPHAGITIS: ICD-10-CM

## 2020-03-30 DIAGNOSIS — J30.9 CHRONIC ALLERGIC RHINITIS: ICD-10-CM

## 2020-03-31 RX ORDER — OMEPRAZOLE 20 MG/1
20 CAPSULE, DELAYED RELEASE ORAL DAILY
Qty: 30 CAPSULE | Refills: 5 | Status: SHIPPED | OUTPATIENT
Start: 2020-03-31

## 2020-03-31 RX ORDER — FEXOFENADINE HCL 180 MG/1
180 TABLET ORAL DAILY
Qty: 30 TABLET | Refills: 5 | Status: SHIPPED | OUTPATIENT
Start: 2020-03-31 | End: 2020-10-30

## 2020-04-09 RX ORDER — ATORVASTATIN CALCIUM 40 MG/1
40 TABLET, FILM COATED ORAL NIGHTLY
Qty: 90 TABLET | Refills: 0 | Status: SHIPPED | OUTPATIENT
Start: 2020-04-09 | End: 2020-05-04

## 2020-04-19 DIAGNOSIS — N92.1 EXCESSIVE AND FREQUENT MENSTRUATION WITH IRREGULAR CYCLE: ICD-10-CM

## 2020-04-20 RX ORDER — MEDROXYPROGESTERONE ACETATE 10 MG/1
10 TABLET ORAL DAILY
Qty: 30 TABLET | Refills: 1 | Status: SHIPPED | OUTPATIENT
Start: 2020-04-20 | End: 2020-10-21 | Stop reason: SDUPTHER

## 2020-04-20 NOTE — TELEPHONE ENCOUNTER
I will only provide 60 days of Provera then she will absolutely have to be seen for anymore. She should have been out in December and she repeatedly cancels her follow up visits with me. I believe she also never followed up with OB/GYN for surgical consult.

## 2020-05-04 RX ORDER — ATORVASTATIN CALCIUM 40 MG/1
40 TABLET, FILM COATED ORAL NIGHTLY
Qty: 90 TABLET | Refills: 0 | Status: SHIPPED | OUTPATIENT
Start: 2020-05-04 | End: 2020-10-21 | Stop reason: DRUGHIGH

## 2020-05-05 DIAGNOSIS — N92.1 EXCESSIVE AND FREQUENT MENSTRUATION WITH IRREGULAR CYCLE: ICD-10-CM

## 2020-05-05 RX ORDER — MEDROXYPROGESTERONE ACETATE 10 MG/1
TABLET ORAL
Qty: 30 TABLET | Refills: 0 | OUTPATIENT
Start: 2020-05-05

## 2020-06-26 DIAGNOSIS — E16.1 HYPERINSULINEMIA: ICD-10-CM

## 2020-06-29 DIAGNOSIS — E16.1 HYPERINSULINEMIA: ICD-10-CM

## 2020-09-11 DIAGNOSIS — N92.1 EXCESSIVE AND FREQUENT MENSTRUATION WITH IRREGULAR CYCLE: ICD-10-CM

## 2020-09-14 RX ORDER — MEDROXYPROGESTERONE ACETATE 10 MG/1
10 TABLET ORAL DAILY
Qty: 30 TABLET | Refills: 1 | OUTPATIENT
Start: 2020-09-14

## 2020-10-14 ENCOUNTER — LAB (OUTPATIENT)
Dept: LAB | Facility: OTHER | Age: 45
End: 2020-10-14

## 2020-10-14 ENCOUNTER — TRANSCRIBE ORDERS (OUTPATIENT)
Dept: LAB | Facility: OTHER | Age: 45
End: 2020-10-14

## 2020-10-14 DIAGNOSIS — Z91.018 ALLERGY TO MEAT: ICD-10-CM

## 2020-10-14 DIAGNOSIS — Z91.018 ALLERGY TO MEAT: Primary | ICD-10-CM

## 2020-10-14 PROCEDURE — 36415 COLL VENOUS BLD VENIPUNCTURE: CPT | Performed by: INTERNAL MEDICINE

## 2020-10-14 PROCEDURE — 86003 ALLG SPEC IGE CRUDE XTRC EA: CPT | Performed by: ALLERGY & IMMUNOLOGY

## 2020-10-14 PROCEDURE — 82785 ASSAY OF IGE: CPT | Performed by: ALLERGY & IMMUNOLOGY

## 2020-10-14 PROCEDURE — 86008 ALLG SPEC IGE RECOMB EA: CPT | Performed by: ALLERGY & IMMUNOLOGY

## 2020-10-14 PROCEDURE — 86003 ALLG SPEC IGE CRUDE XTRC EA: CPT

## 2020-10-18 LAB
COW MILK IGE QN: 0.34 KU/L
IGE SERPL-ACNC: 174 IU/ML (ref 6–495)

## 2020-10-19 LAB
ALPHA-GAL IGE QN: 14.9 KU/L
BEEF IGE QN: 1.31 KU/L
DEPRECATED BEEF IGE RAST QL: 2
DEPRECATED LAMB IGE RAST QL: ABNORMAL
DEPRECATED PORK IGE RAST QL: 1
LAMB IGE QN: 0.21 KU/L
PORK IGE QN: 0.58 KU/L

## 2020-10-21 ENCOUNTER — OFFICE VISIT (OUTPATIENT)
Dept: OBSTETRICS AND GYNECOLOGY | Facility: CLINIC | Age: 45
End: 2020-10-21

## 2020-10-21 VITALS
HEIGHT: 65 IN | HEART RATE: 70 BPM | WEIGHT: 172 LBS | SYSTOLIC BLOOD PRESSURE: 120 MMHG | DIASTOLIC BLOOD PRESSURE: 70 MMHG | BODY MASS INDEX: 28.66 KG/M2

## 2020-10-21 DIAGNOSIS — N92.1 EXCESSIVE AND FREQUENT MENSTRUATION WITH IRREGULAR CYCLE: ICD-10-CM

## 2020-10-21 PROCEDURE — 99213 OFFICE O/P EST LOW 20 MIN: CPT | Performed by: NURSE PRACTITIONER

## 2020-10-21 RX ORDER — ALBUTEROL SULFATE 90 UG/1
AEROSOL, METERED RESPIRATORY (INHALATION)
COMMUNITY
Start: 2020-10-13

## 2020-10-21 RX ORDER — ONDANSETRON 4 MG/1
TABLET, FILM COATED ORAL
COMMUNITY
Start: 2020-09-16

## 2020-10-21 RX ORDER — LOSARTAN POTASSIUM 25 MG/1
TABLET ORAL
COMMUNITY
Start: 2020-10-07

## 2020-10-21 RX ORDER — ATORVASTATIN CALCIUM 10 MG/1
TABLET, FILM COATED ORAL
COMMUNITY
Start: 2020-10-07

## 2020-10-21 RX ORDER — MEDROXYPROGESTERONE ACETATE 5 MG/1
5 TABLET ORAL DAILY
Qty: 30 TABLET | Refills: 0 | Status: SHIPPED | OUTPATIENT
Start: 2020-10-21 | End: 2020-11-19 | Stop reason: SDUPTHER

## 2020-10-21 RX ORDER — EPINEPHRINE 0.3 MG/.3ML
INJECTION SUBCUTANEOUS
COMMUNITY
Start: 2020-10-13

## 2020-10-21 RX ORDER — BUPRENORPHINE HYDROCHLORIDE AND NALOXONE HYDROCHLORIDE DIHYDRATE 8; 2 MG/1; MG/1
TABLET SUBLINGUAL
COMMUNITY
Start: 2020-10-13

## 2020-10-23 PROBLEM — J30.9 CHRONIC ALLERGIC RHINITIS: Status: RESOLVED | Noted: 2018-10-09 | Resolved: 2020-10-23

## 2020-10-23 PROBLEM — D50.9 IRON DEFICIENCY ANEMIA: Status: RESOLVED | Noted: 2018-08-30 | Resolved: 2020-10-23

## 2020-10-23 PROBLEM — F32.A DEPRESSIVE DISORDER: Status: ACTIVE | Noted: 2019-12-17

## 2020-10-23 PROBLEM — H10.13 ACUTE ALLERGIC CONJUNCTIVITIS OF BOTH EYES: Status: RESOLVED | Noted: 2019-07-02 | Resolved: 2020-10-23

## 2020-10-23 NOTE — PROGRESS NOTES
"Subjective   Jaimie Ignacia Akers is a 45 y.o. female.     History of Present Illness   Pt presents, scheduled for her annual, but is bleeding and requests refills on Provera for excessive menstruation. I saw pt 6/2019 for her last annual and increased her provera to 10mg while she waited to get in with OB/GYN for a BTL and endometrial ablation consult. Pt never went to the appointment. She states she doesn't know why but she keeps putting it off. Today she states the 10mg \"threw her for a loop\" and she went back to 5mg daily by cutting tablets in half and that's why she hasn't run out of them until now. She does want another referral to OB/GYN. I will only place one referral and pt must keep her appointments. She often cancels.     Patient's last menstrual period was 10/18/2020 (exact date). 3-5 regular, moderate flow and severe cramping when taking Provera.    Last MMG was 7/11/19. Pt states she has an order from her PCP KWAN Monk to be completed at [facility].    The following portions of the patient's history were reviewed and updated as appropriate: allergies, current medications, past family history, past medical history, past social history, past surgical history and problem list.    Review of Systems   Constitutional: Negative.  Negative for chills and fever.   Respiratory: Negative.    Cardiovascular: Negative.    Genitourinary: Positive for menstrual problem and vaginal bleeding. Negative for pelvic pain.   Neurological: Negative for dizziness, syncope and light-headedness.       Objective    Vitals:    10/21/20 1335   BP: 120/70   Pulse: 70         10/21/20  1335   Weight: 78 kg (172 lb)     Body mass index is 28.62 kg/m².    Physical Exam  Vitals signs reviewed.   Constitutional:       Appearance: Normal appearance.   Cardiovascular:      Rate and Rhythm: Normal rate and regular rhythm.      Heart sounds: Normal heart sounds.   Pulmonary:      Breath sounds: Normal breath sounds.   Neurological:      " Mental Status: She is alert and oriented to person, place, and time.   Psychiatric:         Mood and Affect: Mood normal.         Behavior: Behavior normal.           Assessment/Plan   Diagnoses and all orders for this visit:    1. Excessive and frequent menstruation with irregular cycle  -     medroxyPROGESTERone (PROVERA) 5 MG tablet; Take 1 tablet by mouth Daily.  Dispense: 30 tablet; Refill: 0    Continue Provera 5mg daily. RTC 11/10 at 10:30AM for complete well woman exam. Referral placed to [facility] for surgical consultation.   Pt agrees with this plan of care.

## 2020-10-26 LAB
REF LAB TEST RESULTS: NORMAL
REF LAB TEST RESULTS: NORMAL

## 2020-10-30 DIAGNOSIS — J30.9 CHRONIC ALLERGIC RHINITIS: ICD-10-CM

## 2020-10-30 RX ORDER — FEXOFENADINE HYDROCHLORIDE 180 MG/1
TABLET, FILM COATED ORAL
Qty: 30 TABLET | Refills: 0 | Status: SHIPPED | OUTPATIENT
Start: 2020-10-30

## 2020-11-19 DIAGNOSIS — N92.1 EXCESSIVE AND FREQUENT MENSTRUATION WITH IRREGULAR CYCLE: ICD-10-CM

## 2020-11-20 RX ORDER — MEDROXYPROGESTERONE ACETATE 5 MG/1
5 TABLET ORAL DAILY
Qty: 30 TABLET | Refills: 0 | Status: SHIPPED | OUTPATIENT
Start: 2020-11-20 | End: 2020-12-15 | Stop reason: SDUPTHER

## 2020-12-15 DIAGNOSIS — N92.1 EXCESSIVE AND FREQUENT MENSTRUATION WITH IRREGULAR CYCLE: ICD-10-CM

## 2020-12-16 RX ORDER — MEDROXYPROGESTERONE ACETATE 5 MG/1
5 TABLET ORAL DAILY
Qty: 30 TABLET | Refills: 0 | Status: SHIPPED | OUTPATIENT
Start: 2020-12-16

## 2020-12-16 RX ORDER — MEDROXYPROGESTERONE ACETATE 5 MG/1
5 TABLET ORAL DAILY
Qty: 30 TABLET | Refills: 0 | OUTPATIENT
Start: 2020-12-16

## 2021-07-28 DIAGNOSIS — E16.1 HYPERINSULINEMIA: ICD-10-CM

## 2022-02-09 ENCOUNTER — TRANSCRIBE ORDERS (OUTPATIENT)
Dept: LAB | Facility: OTHER | Age: 47
End: 2022-02-09

## 2022-02-09 DIAGNOSIS — E53.8 VITAMIN B12 DEFICIENCY: ICD-10-CM

## 2022-02-09 DIAGNOSIS — Z13.29 SCREENING FOR THYROID DISORDER: ICD-10-CM

## 2022-02-09 DIAGNOSIS — E55.9 VITAMIN D DEFICIENCY: ICD-10-CM

## 2022-02-09 DIAGNOSIS — E78.1 HYPERTRIGLYCERIDEMIA: Primary | ICD-10-CM

## 2022-02-09 DIAGNOSIS — Z91.018 ALLERGY TO OTHER FOODS: ICD-10-CM

## 2022-02-09 DIAGNOSIS — I10 ESSENTIAL HYPERTENSION: ICD-10-CM

## 2022-02-10 ENCOUNTER — LAB (OUTPATIENT)
Dept: LAB | Facility: OTHER | Age: 47
End: 2022-02-10

## 2022-02-10 DIAGNOSIS — I10 ESSENTIAL HYPERTENSION: ICD-10-CM

## 2022-02-10 DIAGNOSIS — E78.1 HYPERTRIGLYCERIDEMIA: ICD-10-CM

## 2022-02-10 DIAGNOSIS — Z91.018 ALLERGY TO OTHER FOODS: ICD-10-CM

## 2022-02-10 DIAGNOSIS — E55.9 VITAMIN D DEFICIENCY: ICD-10-CM

## 2022-02-10 DIAGNOSIS — E53.8 VITAMIN B12 DEFICIENCY: ICD-10-CM

## 2022-02-10 DIAGNOSIS — Z13.29 SCREENING FOR THYROID DISORDER: ICD-10-CM

## 2022-02-10 LAB
ALBUMIN SERPL-MCNC: 4.5 G/DL (ref 3.5–5)
ALBUMIN/GLOB SERPL: 1.4 G/DL (ref 1.1–1.8)
ALP SERPL-CCNC: 95 U/L (ref 38–126)
ALT SERPL W P-5'-P-CCNC: 22 U/L
ANION GAP SERPL CALCULATED.3IONS-SCNC: 7 MMOL/L (ref 5–15)
AST SERPL-CCNC: 24 U/L (ref 14–36)
BASOPHILS # BLD AUTO: 0.02 10*3/MM3 (ref 0–0.2)
BASOPHILS NFR BLD AUTO: 0.3 % (ref 0–1.5)
BILIRUB SERPL-MCNC: 0.3 MG/DL (ref 0.2–1.3)
BUN SERPL-MCNC: 12 MG/DL (ref 7–23)
BUN/CREAT SERPL: 15 (ref 7–25)
CALCIUM SPEC-SCNC: 9.6 MG/DL (ref 8.4–10.2)
CHLORIDE SERPL-SCNC: 101 MMOL/L (ref 101–112)
CHOLEST SERPL-MCNC: 177 MG/DL (ref 150–200)
CO2 SERPL-SCNC: 32 MMOL/L (ref 22–30)
CREAT SERPL-MCNC: 0.8 MG/DL (ref 0.52–1.04)
DEPRECATED RDW RBC AUTO: 39.7 FL (ref 37–54)
EOSINOPHIL # BLD AUTO: 0.07 10*3/MM3 (ref 0–0.4)
EOSINOPHIL NFR BLD AUTO: 1.1 % (ref 0.3–6.2)
ERYTHROCYTE [DISTWIDTH] IN BLOOD BY AUTOMATED COUNT: 12.5 % (ref 12.3–15.4)
GFR SERPL CREATININE-BSD FRML MDRD: 76 ML/MIN/1.73 (ref 58–135)
GLOBULIN UR ELPH-MCNC: 3.2 GM/DL (ref 2.3–3.5)
GLUCOSE SERPL-MCNC: 112 MG/DL (ref 70–99)
HCT VFR BLD AUTO: 42.3 % (ref 34–46.6)
HDLC SERPL-MCNC: 33 MG/DL (ref 40–59)
HGB BLD-MCNC: 14.7 G/DL (ref 12–15.9)
LDLC SERPL CALC-MCNC: 109 MG/DL
LDLC/HDLC SERPL: 3.17 {RATIO} (ref 0–3.22)
LYMPHOCYTES # BLD AUTO: 2.47 10*3/MM3 (ref 0.7–3.1)
LYMPHOCYTES NFR BLD AUTO: 40.4 % (ref 19.6–45.3)
MCH RBC QN AUTO: 30.4 PG (ref 26.6–33)
MCHC RBC AUTO-ENTMCNC: 34.8 G/DL (ref 31.5–35.7)
MCV RBC AUTO: 87.4 FL (ref 79–97)
MONOCYTES # BLD AUTO: 0.38 10*3/MM3 (ref 0.1–0.9)
MONOCYTES NFR BLD AUTO: 6.2 % (ref 5–12)
NEUTROPHILS NFR BLD AUTO: 3.17 10*3/MM3 (ref 1.7–7)
NEUTROPHILS NFR BLD AUTO: 52 % (ref 42.7–76)
PLATELET # BLD AUTO: 276 10*3/MM3 (ref 140–450)
PMV BLD AUTO: 9.9 FL (ref 6–12)
POTASSIUM SERPL-SCNC: 4 MMOL/L (ref 3.4–5)
PROT SERPL-MCNC: 7.7 G/DL (ref 6.3–8.6)
RBC # BLD AUTO: 4.84 10*6/MM3 (ref 3.77–5.28)
SODIUM SERPL-SCNC: 140 MMOL/L (ref 137–145)
TRIGL SERPL-MCNC: 197 MG/DL
VLDLC SERPL-MCNC: 35 MG/DL (ref 5–40)
WBC NRBC COR # BLD: 6.11 10*3/MM3 (ref 3.4–10.8)

## 2022-02-10 PROCEDURE — 80061 LIPID PANEL: CPT | Performed by: INTERNAL MEDICINE

## 2022-02-10 PROCEDURE — 86003 ALLG SPEC IGE CRUDE XTRC EA: CPT | Performed by: NURSE PRACTITIONER

## 2022-02-10 PROCEDURE — 85025 COMPLETE CBC W/AUTO DIFF WBC: CPT | Performed by: INTERNAL MEDICINE

## 2022-02-10 PROCEDURE — 84443 ASSAY THYROID STIM HORMONE: CPT | Performed by: NURSE PRACTITIONER

## 2022-02-10 PROCEDURE — 80053 COMPREHEN METABOLIC PANEL: CPT | Performed by: INTERNAL MEDICINE

## 2022-02-10 PROCEDURE — 82607 VITAMIN B-12: CPT | Performed by: NURSE PRACTITIONER

## 2022-02-10 PROCEDURE — 82785 ASSAY OF IGE: CPT | Performed by: NURSE PRACTITIONER

## 2022-02-10 PROCEDURE — 82306 VITAMIN D 25 HYDROXY: CPT | Performed by: NURSE PRACTITIONER

## 2022-02-10 PROCEDURE — 86008 ALLG SPEC IGE RECOMB EA: CPT | Performed by: NURSE PRACTITIONER

## 2022-02-11 LAB
25(OH)D3 SERPL-MCNC: 33.7 NG/ML (ref 30–100)
TSH SERPL DL<=0.05 MIU/L-ACNC: 1.73 UIU/ML (ref 0.27–4.2)
VIT B12 BLD-MCNC: 562 PG/ML (ref 211–946)

## 2022-02-17 LAB
ALPHA-GAL IGE QN: 3.71 KU/L
BEEF IGE QN: 0.54 KU/L
CONV CLASS DESCRIPTION: ABNORMAL
IGE SERPL-ACNC: 89 IU/ML (ref 6–495)
LAMB IGE QN: 0.11 KU/L
PORK IGE QN: 0.18 KU/L

## 2023-05-15 ENCOUNTER — LAB (OUTPATIENT)
Dept: LAB | Facility: OTHER | Age: 48
End: 2023-05-15
Payer: MEDICAID

## 2023-05-15 ENCOUNTER — OFFICE VISIT (OUTPATIENT)
Dept: OBSTETRICS AND GYNECOLOGY | Facility: CLINIC | Age: 48
End: 2023-05-15
Payer: MEDICAID

## 2023-05-15 DIAGNOSIS — Z12.31 SCREENING MAMMOGRAM, ENCOUNTER FOR: ICD-10-CM

## 2023-05-15 DIAGNOSIS — N91.4 SECONDARY OLIGOMENORRHEA: ICD-10-CM

## 2023-05-15 DIAGNOSIS — R53.82 CHRONIC FATIGUE: Primary | ICD-10-CM

## 2023-05-15 DIAGNOSIS — N95.1 PERIMENOPAUSE: ICD-10-CM

## 2023-05-15 PROCEDURE — 82670 ASSAY OF TOTAL ESTRADIOL: CPT | Performed by: NURSE PRACTITIONER

## 2023-05-15 PROCEDURE — 84443 ASSAY THYROID STIM HORMONE: CPT | Performed by: NURSE PRACTITIONER

## 2023-05-15 PROCEDURE — 84439 ASSAY OF FREE THYROXINE: CPT | Performed by: NURSE PRACTITIONER

## 2023-05-15 PROCEDURE — 83001 ASSAY OF GONADOTROPIN (FSH): CPT | Performed by: NURSE PRACTITIONER

## 2023-05-15 PROCEDURE — 84144 ASSAY OF PROGESTERONE: CPT | Performed by: NURSE PRACTITIONER

## 2023-05-15 RX ORDER — FEXOFENADINE HCL 180 MG/1
180 TABLET ORAL DAILY
Qty: 30 TABLET | Refills: 11 | COMMUNITY
Start: 2023-03-13 | End: 2024-03-13

## 2023-05-15 RX ORDER — LAMOTRIGINE 25 MG/1
1 TABLET ORAL DAILY
COMMUNITY
Start: 2023-05-05

## 2023-05-15 RX ORDER — ALPRAZOLAM 0.5 MG/1
TABLET ORAL
COMMUNITY
Start: 2023-05-05

## 2023-05-15 RX ORDER — ATORVASTATIN CALCIUM 20 MG/1
20 TABLET, FILM COATED ORAL EVERY EVENING
COMMUNITY
Start: 2023-04-14

## 2023-05-15 RX ORDER — CARIPRAZINE 1.5 MG/1
CAPSULE, GELATIN COATED ORAL
COMMUNITY
Start: 2023-05-04

## 2023-05-15 RX ORDER — HYDROXYZINE HYDROCHLORIDE 10 MG/1
TABLET, FILM COATED ORAL
COMMUNITY
Start: 2023-05-04

## 2023-05-15 RX ORDER — OMEPRAZOLE 40 MG/1
1 CAPSULE, DELAYED RELEASE ORAL DAILY
COMMUNITY
Start: 2023-04-14

## 2023-05-16 VITALS
HEART RATE: 64 BPM | SYSTOLIC BLOOD PRESSURE: 142 MMHG | DIASTOLIC BLOOD PRESSURE: 78 MMHG | WEIGHT: 159.6 LBS | BODY MASS INDEX: 25.65 KG/M2 | HEIGHT: 66 IN

## 2023-05-16 LAB
ESTRADIOL SERPL HS-MCNC: 6.5 PG/ML
FSH SERPL-ACNC: 79.1 MIU/ML
PROGEST SERPL-MCNC: <0.05 NG/ML
T4 FREE SERPL-MCNC: 1.57 NG/DL (ref 0.93–1.7)
TSH SERPL DL<=0.05 MIU/L-ACNC: 0.73 UIU/ML (ref 0.27–4.2)

## 2023-05-16 NOTE — PROGRESS NOTES
"Subjective   Jaimie Ignacia Akers is a 48 y.o. female.     History of Present Illness   Pt presents with concerns of extreme fatigue and requesting hormones be checked. She notes that the fatigue started 5 months ago. She states she can be driving and nearly be falling over asleep and have to get out and stretch. She struggles to concentrate at work as well. Her mental health NP ran labs that were \"all normal\" and suggested it could be hormonal. She is on xanax, suboxone, hydroxyzine, lamictal, and vraylar. She denies these medications as the cause because she was just recently started on them in the last few months and fatigue was occurring prior to starting. Suboxone has been prescribed for several years.      Patient's last menstrual period was 02/15/2023 (approximate). Periods have been irregular. I have not seen this patient in 3 years, the last time I saw her we were managing excessive and frequent bleeding with provera 5mg. She was considering ablation but declined. She states she has been off of provera for about 1 year because her PCP Nuria Monk continued to fill it for her for a while. According to records, 18 months ago, she was not on it. So it is unclear exactly how long she has been without it. Since stopping periods restarted and have been occurring q 3 months. Lasting 3-5 days, moderate flow, mild cramping.     She denies hot flashes, night sweats, vaginal dryness. Is not sexually active.     The following portions of the patient's history were reviewed and updated as appropriate: allergies, current medications, past family history, past medical history, past social history, past surgical history and problem list.    Review of Systems   Constitutional: Positive for fatigue and unexpected weight loss (\"due to stress\" per pt). Negative for chills, diaphoresis and fever.   Respiratory: Negative.    Cardiovascular: Negative.    Endocrine: Negative for cold intolerance, heat intolerance, polydipsia, " polyphagia and polyuria.   Genitourinary: Positive for menstrual problem (irregular). Negative for dyspareunia, pelvic pain and vaginal pain.   Neurological: Negative for dizziness and seizures.   Psychiatric/Behavioral: Positive for sleep disturbance (sleeping too much and not sleeping at all), depressed mood and stress. Negative for suicidal ideas. The patient is nervous/anxious.        Objective    Vitals:    05/15/23 1533   BP: 142/78   Pulse: 64         05/15/23  1533   Weight: 72.4 kg (159 lb 9.6 oz)     Body mass index is 25.76 kg/m².    Physical Exam  Vitals reviewed.   Constitutional:       General: She is not in acute distress.     Appearance: Normal appearance. She is not ill-appearing.   Cardiovascular:      Rate and Rhythm: Normal rate and regular rhythm.      Heart sounds: Normal heart sounds.   Pulmonary:      Effort: Pulmonary effort is normal.      Breath sounds: Normal breath sounds.   Skin:     General: Skin is warm and dry.   Neurological:      Mental Status: She is alert and oriented to person, place, and time.   Psychiatric:         Attention and Perception: Attention normal.         Mood and Affect: Mood normal. Affect is flat.         Speech: Speech normal.         Behavior: Behavior normal.         Thought Content: Thought content normal.       April Gutierrez, [facility] labs (scanned to the chart):   Glucose 105  Vit D 34  TSH 0.45  CBC WNL  B12 423    Assessment & Plan   Diagnoses and all orders for this visit:    1. Chronic fatigue (Primary)  -     Follicle Stimulating Hormone  -     Progesterone  -     Estradiol  -     TSH  -     T4, Free    2. Secondary oligomenorrhea  -     Follicle Stimulating Hormone  -     Progesterone  -     Estradiol  -     TSH  -     T4, Free    I discussed menopause vs perimenopause. With not having any vasomotor symptoms or vaginal dryness concerns, it is unclear if fatigue is really hormonal. I explained that labs can vary throughout the cycle and since she still has  had bleeding in the last few months, we would still call it perimenopause until she went 12 full months with out bleeding. She voices understanding. I recommend we obtain FSH, progesterone, estradiol levels just as a guide. I will recheck TSH and add T4 since it was at the low end of normal. She needs a daily Vit D supplement. States she has one at home but doesn't take it. She needs to do so.     I will call with results and discuss next steps PRN. She is not opposed to HRT but if we do proceed with it, she will need MMG and WWE prior to starting and must keep FU appointments as scheduled or we will discontinue scripts. We also wouldn't continue HRT if seeing no improvement, as the fatigue may be significantly related to medications or mental health as well since it is not accompanied by additional menopausal symptoms.     All questions answered. I will call with labs and discuss next steps.

## 2023-05-26 ENCOUNTER — LAB (OUTPATIENT)
Dept: LAB | Facility: OTHER | Age: 48
End: 2023-05-26

## 2023-05-26 ENCOUNTER — OFFICE VISIT (OUTPATIENT)
Dept: OBSTETRICS AND GYNECOLOGY | Facility: CLINIC | Age: 48
End: 2023-05-26

## 2023-05-26 VITALS
HEART RATE: 76 BPM | HEIGHT: 66 IN | WEIGHT: 155.6 LBS | BODY MASS INDEX: 25.01 KG/M2 | DIASTOLIC BLOOD PRESSURE: 80 MMHG | SYSTOLIC BLOOD PRESSURE: 128 MMHG

## 2023-05-26 DIAGNOSIS — Z79.890 HORMONE REPLACEMENT THERAPY: ICD-10-CM

## 2023-05-26 DIAGNOSIS — Z01.419 ENCOUNTER FOR GYNECOLOGICAL EXAMINATION WITH PAPANICOLAOU SMEAR OF CERVIX: Primary | ICD-10-CM

## 2023-05-26 RX ORDER — ESTRADIOL 0.5 MG/1
0.5 TABLET ORAL DAILY
Qty: 30 TABLET | Refills: 1 | Status: SHIPPED | OUTPATIENT
Start: 2023-05-26

## 2023-05-26 RX ORDER — PROGESTERONE 100 MG/1
100 CAPSULE ORAL DAILY
Qty: 30 CAPSULE | Refills: 1 | Status: SHIPPED | OUTPATIENT
Start: 2023-05-26

## 2023-05-30 NOTE — PROGRESS NOTES
Subjective   Chief Complaint   Patient presents with   • Gynecologic Exam     WWREBEKAH Akers is a 48 y.o. year old  presenting to be seen for her annual exam.  Today she is also following up on labs for excessive fatigue and irregular periods. FSH was 79, progesterone <0.05, Estradiol 6.5. No periods in 3 months but was skipping and infrequent prior to that as well. I discussed that while labs do appear menopausal levels, we cannot call menopause until 12 complete months without a period. She voices understanding. She does still want to move forward with HRT. She understands she MUST keep MMG appointment or the script will be canceled. Scheduled .     Patient's last menstrual period was 02/15/2023 (approximate).    OB History        1    Para   1    Term   1            AB        Living   1       SAB        IAB        Ectopic        Molar        Multiple        Live Births                    Current birth control method: none.    She is not sexually active.  In the past 12 months there has not been new sexual partners.  Condoms are not typically used.  She would not like to be screened for STD's at today's exam.     Past 6 month menstrual history:    Cycle Frequency: irregular  infrequent   Menstrual cycle character: flow is typically normal   Cycle Duration: 3 - 5   Number of heavy days of flows: 1   Dysmenorrhea: mild and is not affecting her activities of daily living   PMS: is not affecting her activities of daily living   Intermenstrual bleeding present: no   Post-coital bleeding present: no     She exercises regularly: yes.  She wears her seat belt:yes.  She has concerns about domestic violence: no.  Last colonoscopy: Never  Last DEXA: NEver  Last MM, scheduled 23  Last pap: 2019  History of abnormal PAP: No    The following portions of the patient's history were reviewed and updated as appropriate:problem list, current medications, allergies, past  "family history, past medical history, past social history and past surgical history.    Social History    Tobacco Use      Smoking status: Never      Smokeless tobacco: Never    Social History     Substance and Sexual Activity   Alcohol Use No      Review of Systems   Constitutional: Positive for fatigue. Negative for appetite change, chills, diaphoresis and fever.   Respiratory: Negative.    Cardiovascular: Negative.    Gastrointestinal: Negative.    Endocrine: Negative.  Negative for cold intolerance and heat intolerance.   Genitourinary: Negative.  Negative for dyspareunia, menstrual problem (irregular), pelvic pain and vaginal discharge.   Skin: Negative.    Neurological: Negative for dizziness, syncope, light-headedness and headaches.   Psychiatric/Behavioral: Positive for sleep disturbance. Negative for suicidal ideas. The patient is nervous/anxious.         Depression         Objective   /80   Pulse 76   Ht 167.6 cm (66\")   Wt 70.6 kg (155 lb 9.6 oz)   LMP 02/15/2023 (Approximate)   Breastfeeding No   BMI 25.11 kg/m²     General:  well developed; well nourished  no acute distress   Skin:  No suspicious lesions seen   Thyroid: not examined   Breasts:  Examined in supine position  Symmetric without masses or skin dimpling  Nipples normal without inversion, lesions or discharge  There are no palpable axillary nodes   Abdomen: soft, non-tender; no masses  no umbilical or inguinal hernias are present  no hepato-splenomegaly  Normal findings: bowel sounds normal   Cardiac: Heart sounds are normal.  Regular rate and rhythm without murmur, gallop or rub.   Resp: Normal expansion.  Clear to auscultation.  No rales, rhonchi, or wheezing.   Psych: alert,oriented, in NAD with a full range of affect, normal behavior and no psychotic features   Pelvis: Uterus:  Clinical staff was present for exam  External genitalia:  normal appearance of the external genitalia including Bartholin's and Palma Sola's glands.  :  " urethral meatus normal;  Vaginal:  normal pink mucosa without prolapse or lesions  Cervix:  normal appearance.  Uterus:  normal size, shape and consistency  Adnexa:  normal bimanual exam of the adnexa.  Rectal:  digital rectal exam not performed; anus visually normal appearing.     Lab Review   FSH and progesterone, estradiol    Imaging  Mammogram report       Diagnoses and all orders for this visit:    1. Encounter for gynecological examination with Papanicolaou smear of cervix (Primary)  -     LIQUID-BASED PAP SMEAR WITH HPV GENOTYPING REGARDLESS OF INTERPRETATION (GREGG,COR,MAD)    2. Hormone replacement therapy  -     estradiol (ESTRACE) 0.5 MG tablet; Take 1 tablet by mouth Daily.  Dispense: 30 tablet; Refill: 1  -     Progesterone (Prometrium) 100 MG capsule; Take 1 capsule by mouth Daily.  Dispense: 30 capsule; Refill: 1    Pap results: I will send card in mail or call if abnormal. RTC annually for WWE or sooner PRN.     SBE encouraged monthly. MMG annually stressed to pt, especially if she is going to take HRT. Pt must keep MMG appointment or script will be canceled at the pharmacy.     DEXA not indicated. Colon cancer screening declined.     I reviewed labs with pt. While they do appear in menopausal range, I discussed the potential for these to drastically fluctuate and the potential to have another period again. Menopause is not definite until 1 year without any bleeding. She still needs to use condoms as contraceptive until then. Pt voices understanding. She understands the CV risks and slight increase risk of breast cancer with combined HRT. She also understands the necessity of taking combined with an intact uterus. Begin with estradiol 0.5m and prometrium 100mg daily. RTC in 6 weeks for FU or sooner PRN.     This note was electronically signed.    Iram Echols, APRN  May 30, 2023

## 2023-05-31 LAB — REF LAB TEST METHOD: NORMAL

## 2023-07-25 DIAGNOSIS — Z79.890 HORMONE REPLACEMENT THERAPY: ICD-10-CM

## 2023-07-26 RX ORDER — PROGESTERONE 100 MG/1
100 CAPSULE ORAL DAILY
Qty: 30 CAPSULE | Refills: 2 | OUTPATIENT
Start: 2023-07-26

## 2023-09-07 DIAGNOSIS — Z79.890 HORMONE REPLACEMENT THERAPY: ICD-10-CM

## 2023-09-08 RX ORDER — ESTRADIOL 1 MG/1
1 TABLET ORAL DAILY
Qty: 30 TABLET | Refills: 2 | OUTPATIENT
Start: 2023-09-08